# Patient Record
Sex: MALE | Race: WHITE | NOT HISPANIC OR LATINO | URBAN - METROPOLITAN AREA
[De-identification: names, ages, dates, MRNs, and addresses within clinical notes are randomized per-mention and may not be internally consistent; named-entity substitution may affect disease eponyms.]

---

## 2019-04-09 ENCOUNTER — EMERGENCY (EMERGENCY)
Facility: HOSPITAL | Age: 69
LOS: 0 days | Discharge: HOME | End: 2019-04-09
Attending: EMERGENCY MEDICINE | Admitting: EMERGENCY MEDICINE
Payer: MEDICARE

## 2019-04-09 VITALS
DIASTOLIC BLOOD PRESSURE: 87 MMHG | OXYGEN SATURATION: 99 % | TEMPERATURE: 97 F | SYSTOLIC BLOOD PRESSURE: 208 MMHG | RESPIRATION RATE: 20 BRPM | HEART RATE: 67 BPM

## 2019-04-09 VITALS — RESPIRATION RATE: 18 BRPM | DIASTOLIC BLOOD PRESSURE: 99 MMHG | HEART RATE: 70 BPM | SYSTOLIC BLOOD PRESSURE: 198 MMHG

## 2019-04-09 DIAGNOSIS — Z87.438 PERSONAL HISTORY OF OTHER DISEASES OF MALE GENITAL ORGANS: ICD-10-CM

## 2019-04-09 DIAGNOSIS — R10.9 UNSPECIFIED ABDOMINAL PAIN: ICD-10-CM

## 2019-04-09 DIAGNOSIS — R10.30 LOWER ABDOMINAL PAIN, UNSPECIFIED: ICD-10-CM

## 2019-04-09 DIAGNOSIS — N20.0 CALCULUS OF KIDNEY: ICD-10-CM

## 2019-04-09 LAB
ANION GAP SERPL CALC-SCNC: 13 MMOL/L — SIGNIFICANT CHANGE UP (ref 7–14)
APPEARANCE UR: CLEAR — SIGNIFICANT CHANGE UP
BASOPHILS # BLD AUTO: 0.03 K/UL — SIGNIFICANT CHANGE UP (ref 0–0.2)
BASOPHILS NFR BLD AUTO: 0.4 % — SIGNIFICANT CHANGE UP (ref 0–1)
BILIRUB UR-MCNC: NEGATIVE — SIGNIFICANT CHANGE UP
BUN SERPL-MCNC: 13 MG/DL — SIGNIFICANT CHANGE UP (ref 10–20)
CALCIUM SERPL-MCNC: 9.8 MG/DL — SIGNIFICANT CHANGE UP (ref 8.5–10.1)
CHLORIDE SERPL-SCNC: 100 MMOL/L — SIGNIFICANT CHANGE UP (ref 98–110)
CO2 SERPL-SCNC: 26 MMOL/L — SIGNIFICANT CHANGE UP (ref 17–32)
COLOR SPEC: YELLOW — SIGNIFICANT CHANGE UP
CREAT SERPL-MCNC: 1 MG/DL — SIGNIFICANT CHANGE UP (ref 0.7–1.5)
DIFF PNL FLD: NEGATIVE — SIGNIFICANT CHANGE UP
EOSINOPHIL # BLD AUTO: 0.31 K/UL — SIGNIFICANT CHANGE UP (ref 0–0.7)
EOSINOPHIL NFR BLD AUTO: 3.7 % — SIGNIFICANT CHANGE UP (ref 0–8)
GLUCOSE SERPL-MCNC: 84 MG/DL — SIGNIFICANT CHANGE UP (ref 70–99)
GLUCOSE UR QL: NEGATIVE MG/DL — SIGNIFICANT CHANGE UP
HCT VFR BLD CALC: 47 % — SIGNIFICANT CHANGE UP (ref 42–52)
HGB BLD-MCNC: 15.5 G/DL — SIGNIFICANT CHANGE UP (ref 14–18)
IMM GRANULOCYTES NFR BLD AUTO: 0.2 % — SIGNIFICANT CHANGE UP (ref 0.1–0.3)
KETONES UR-MCNC: NEGATIVE — SIGNIFICANT CHANGE UP
LEUKOCYTE ESTERASE UR-ACNC: NEGATIVE — SIGNIFICANT CHANGE UP
LYMPHOCYTES # BLD AUTO: 2.12 K/UL — SIGNIFICANT CHANGE UP (ref 1.2–3.4)
LYMPHOCYTES # BLD AUTO: 25.5 % — SIGNIFICANT CHANGE UP (ref 20.5–51.1)
MCHC RBC-ENTMCNC: 32.4 PG — HIGH (ref 27–31)
MCHC RBC-ENTMCNC: 33 G/DL — SIGNIFICANT CHANGE UP (ref 32–37)
MCV RBC AUTO: 98.3 FL — HIGH (ref 80–94)
MONOCYTES # BLD AUTO: 0.71 K/UL — HIGH (ref 0.1–0.6)
MONOCYTES NFR BLD AUTO: 8.6 % — SIGNIFICANT CHANGE UP (ref 1.7–9.3)
NEUTROPHILS # BLD AUTO: 5.11 K/UL — SIGNIFICANT CHANGE UP (ref 1.4–6.5)
NEUTROPHILS NFR BLD AUTO: 61.6 % — SIGNIFICANT CHANGE UP (ref 42.2–75.2)
NITRITE UR-MCNC: NEGATIVE — SIGNIFICANT CHANGE UP
NRBC # BLD: 0 /100 WBCS — SIGNIFICANT CHANGE UP (ref 0–0)
PH UR: 8 — SIGNIFICANT CHANGE UP (ref 5–8)
PLATELET # BLD AUTO: 200 K/UL — SIGNIFICANT CHANGE UP (ref 130–400)
POTASSIUM SERPL-MCNC: 4.2 MMOL/L — SIGNIFICANT CHANGE UP (ref 3.5–5)
POTASSIUM SERPL-SCNC: 4.2 MMOL/L — SIGNIFICANT CHANGE UP (ref 3.5–5)
PROT UR-MCNC: NEGATIVE MG/DL — SIGNIFICANT CHANGE UP
RBC # BLD: 4.78 M/UL — SIGNIFICANT CHANGE UP (ref 4.7–6.1)
RBC # FLD: 13.5 % — SIGNIFICANT CHANGE UP (ref 11.5–14.5)
SODIUM SERPL-SCNC: 139 MMOL/L — SIGNIFICANT CHANGE UP (ref 135–146)
SP GR SPEC: 1.01 — SIGNIFICANT CHANGE UP (ref 1.01–1.03)
UROBILINOGEN FLD QL: 0.2 MG/DL — SIGNIFICANT CHANGE UP (ref 0.2–0.2)
WBC # BLD: 8.3 K/UL — SIGNIFICANT CHANGE UP (ref 4.8–10.8)
WBC # FLD AUTO: 8.3 K/UL — SIGNIFICANT CHANGE UP (ref 4.8–10.8)

## 2019-04-09 PROCEDURE — 74177 CT ABD & PELVIS W/CONTRAST: CPT | Mod: 26

## 2019-04-09 PROCEDURE — 99284 EMERGENCY DEPT VISIT MOD MDM: CPT | Mod: GC

## 2019-04-09 RX ORDER — TAMSULOSIN HYDROCHLORIDE 0.4 MG/1
1 CAPSULE ORAL
Qty: 7 | Refills: 0
Start: 2019-04-09 | End: 2019-04-15

## 2019-04-09 NOTE — ED PROVIDER NOTE - CARE PROVIDERS DIRECT ADDRESSES
[email or web address]

## 2019-04-09 NOTE — ED PROVIDER NOTE - PHYSICAL EXAMINATION
CONSTITUTIONAL: Well-developed; well-nourished; in no acute distress. Well appearing   SKIN: warm, dry  HEAD: Normocephalic; atraumatic.  EYES: no conj injection  CARD: S1, S2 normal; no murmurs, gallops, or rubs. Regular rate and rhythm.   RESP: No wheezes, rales or rhonchi.  ABD: soft ntnd  : no CVA tenderness, no tenderness in suprapubic region   EXT: Normal ROM.  No clubbing, cyanosis or edema.   NEURO: Alert, oriented, grossly unremarkable  PSYCH: Cooperative, appropriate.

## 2019-04-09 NOTE — ED PROVIDER NOTE - NS ED ROS FT
Constitutional: See HPI.  Eyes: No visual changes, eye pain or discharge.  ENMT: No hearing changes, pain, discharge or infections. No neck pain or stiffness.  Cardiac: No chest pain, SOB or edema. No chest pain with exertion.  Respiratory: No cough or respiratory distress.   GI: +suprapubic abdominal pain; No nausea, vomiting, diarrhea   : No dysuria, frequency or burning.  MS: No myalgia, muscle weakness, joint pain or back pain.  Neuro: No headache or weakness. No LOC.  Skin: No skin rash.  Endo: No hx of DM, thyroid disease  Except as documented in HPI, all other review of systems is negative

## 2019-04-09 NOTE — ED PROVIDER NOTE - PROGRESS NOTE DETAILS
s/o mary f/u CT Patient aware of results - understands to follow up with PMD at scheduled appt tomorrow at 2 pm and to follow up with urologist as necessary. Patient aware of results - understands to follow up with PMD in [place] at scheduled appt tomorrow at 2 pm and to follow up with urologist as necessary. Patient found to be continuously hypertensive in ER likely due to pain - patient is aware and advised to follow up with PMD as scheduled for tomorrow at 2 pm.

## 2019-04-09 NOTE — ED PROVIDER NOTE - OBJECTIVE STATEMENT
70 y/o male with pmxh of BPH presents with abdominal pain. Patient states he woke up to urinate around 2-3AM earlier today, began to have suprapubic pain that radiates to the back b/l. Patient states the pain is constant, not alleviated by aleve, not positional. Patient denies fevers/chills, n/v/d, constipation, hematuria, decreased urine output.

## 2019-04-09 NOTE — ED ADULT NURSE REASSESSMENT NOTE - NS ED NURSE REASSESS COMMENT FT1
Pt vital signs assessed, MD Shah made aware, pt okay to be pt discharged to home and will f/u w/ PMD. Asymptomatic, no s/s of distress noted.

## 2019-04-09 NOTE — ED PROVIDER NOTE - ATTENDING CONTRIBUTION TO CARE
69 M to ED with abd pain suprapubic, bilat LQ x 1 day.  No fevers, no sick contacts, no injury or fall.   pain persistent but improving in ED.   No h/o abd sx or  disease.    AVSS, exam as noted, CTAB, RRR, abdomen suprapubic tenderness, (+) bowel sounds, neuro nonfocal

## 2019-04-09 NOTE — ED ADULT NURSE NOTE - CHPI ED NUR SYMPTOMS NEG
no chills/no diarrhea/no nausea/no fever/no hematuria/no abdominal distension/no blood in stool/no vomiting

## 2019-04-10 LAB
CULTURE RESULTS: SIGNIFICANT CHANGE UP
SPECIMEN SOURCE: SIGNIFICANT CHANGE UP

## 2019-10-17 PROBLEM — Z00.00 ENCOUNTER FOR PREVENTIVE HEALTH EXAMINATION: Status: ACTIVE | Noted: 2019-10-17

## 2019-11-01 ENCOUNTER — EMERGENCY (EMERGENCY)
Facility: HOSPITAL | Age: 69
LOS: 0 days | Discharge: HOME | End: 2019-11-01
Attending: STUDENT IN AN ORGANIZED HEALTH CARE EDUCATION/TRAINING PROGRAM | Admitting: EMERGENCY MEDICINE
Payer: MEDICARE

## 2019-11-01 VITALS
DIASTOLIC BLOOD PRESSURE: 86 MMHG | SYSTOLIC BLOOD PRESSURE: 192 MMHG | HEART RATE: 51 BPM | TEMPERATURE: 98 F | RESPIRATION RATE: 16 BRPM | OXYGEN SATURATION: 100 %

## 2019-11-01 VITALS
HEART RATE: 67 BPM | WEIGHT: 164.91 LBS | SYSTOLIC BLOOD PRESSURE: 199 MMHG | RESPIRATION RATE: 16 BRPM | TEMPERATURE: 96 F | OXYGEN SATURATION: 100 % | DIASTOLIC BLOOD PRESSURE: 93 MMHG

## 2019-11-01 DIAGNOSIS — K40.90 UNILATERAL INGUINAL HERNIA, WITHOUT OBSTRUCTION OR GANGRENE, NOT SPECIFIED AS RECURRENT: ICD-10-CM

## 2019-11-01 DIAGNOSIS — R10.30 LOWER ABDOMINAL PAIN, UNSPECIFIED: ICD-10-CM

## 2019-11-01 LAB
ALBUMIN SERPL ELPH-MCNC: 4.5 G/DL — SIGNIFICANT CHANGE UP (ref 3.5–5.2)
ALP SERPL-CCNC: 99 U/L — SIGNIFICANT CHANGE UP (ref 30–115)
ALT FLD-CCNC: 14 U/L — SIGNIFICANT CHANGE UP (ref 0–41)
ANION GAP SERPL CALC-SCNC: 17 MMOL/L — HIGH (ref 7–14)
APPEARANCE UR: CLEAR — SIGNIFICANT CHANGE UP
AST SERPL-CCNC: 21 U/L — SIGNIFICANT CHANGE UP (ref 0–41)
BASOPHILS # BLD AUTO: 0.04 K/UL — SIGNIFICANT CHANGE UP (ref 0–0.2)
BASOPHILS NFR BLD AUTO: 0.4 % — SIGNIFICANT CHANGE UP (ref 0–1)
BILIRUB SERPL-MCNC: 0.6 MG/DL — SIGNIFICANT CHANGE UP (ref 0.2–1.2)
BILIRUB UR-MCNC: NEGATIVE — SIGNIFICANT CHANGE UP
BUN SERPL-MCNC: 14 MG/DL — SIGNIFICANT CHANGE UP (ref 10–20)
CALCIUM SERPL-MCNC: 9.7 MG/DL — SIGNIFICANT CHANGE UP (ref 8.5–10.1)
CHLORIDE SERPL-SCNC: 100 MMOL/L — SIGNIFICANT CHANGE UP (ref 98–110)
CO2 SERPL-SCNC: 23 MMOL/L — SIGNIFICANT CHANGE UP (ref 17–32)
COLOR SPEC: YELLOW — SIGNIFICANT CHANGE UP
CREAT SERPL-MCNC: 1.1 MG/DL — SIGNIFICANT CHANGE UP (ref 0.7–1.5)
DIFF PNL FLD: NEGATIVE — SIGNIFICANT CHANGE UP
EOSINOPHIL # BLD AUTO: 0.22 K/UL — SIGNIFICANT CHANGE UP (ref 0–0.7)
EOSINOPHIL NFR BLD AUTO: 2.2 % — SIGNIFICANT CHANGE UP (ref 0–8)
GLUCOSE SERPL-MCNC: 99 MG/DL — SIGNIFICANT CHANGE UP (ref 70–99)
GLUCOSE UR QL: NEGATIVE — SIGNIFICANT CHANGE UP
HCT VFR BLD CALC: 48.2 % — SIGNIFICANT CHANGE UP (ref 42–52)
HGB BLD-MCNC: 16.2 G/DL — SIGNIFICANT CHANGE UP (ref 14–18)
IMM GRANULOCYTES NFR BLD AUTO: 0.4 % — HIGH (ref 0.1–0.3)
KETONES UR-MCNC: NEGATIVE — SIGNIFICANT CHANGE UP
LACTATE SERPL-SCNC: 1.7 MMOL/L — SIGNIFICANT CHANGE UP (ref 0.5–2.2)
LEUKOCYTE ESTERASE UR-ACNC: NEGATIVE — SIGNIFICANT CHANGE UP
LIDOCAIN IGE QN: 27 U/L — SIGNIFICANT CHANGE UP (ref 7–60)
LYMPHOCYTES # BLD AUTO: 1.98 K/UL — SIGNIFICANT CHANGE UP (ref 1.2–3.4)
LYMPHOCYTES # BLD AUTO: 19.9 % — LOW (ref 20.5–51.1)
MCHC RBC-ENTMCNC: 32.8 PG — HIGH (ref 27–31)
MCHC RBC-ENTMCNC: 33.6 G/DL — SIGNIFICANT CHANGE UP (ref 32–37)
MCV RBC AUTO: 97.6 FL — HIGH (ref 80–94)
MONOCYTES # BLD AUTO: 0.79 K/UL — HIGH (ref 0.1–0.6)
MONOCYTES NFR BLD AUTO: 7.9 % — SIGNIFICANT CHANGE UP (ref 1.7–9.3)
NEUTROPHILS # BLD AUTO: 6.88 K/UL — HIGH (ref 1.4–6.5)
NEUTROPHILS NFR BLD AUTO: 69.2 % — SIGNIFICANT CHANGE UP (ref 42.2–75.2)
NITRITE UR-MCNC: NEGATIVE — SIGNIFICANT CHANGE UP
NRBC # BLD: 0 /100 WBCS — SIGNIFICANT CHANGE UP (ref 0–0)
PH UR: 6 — SIGNIFICANT CHANGE UP (ref 5–8)
PLATELET # BLD AUTO: 189 K/UL — SIGNIFICANT CHANGE UP (ref 130–400)
POTASSIUM SERPL-MCNC: 4.4 MMOL/L — SIGNIFICANT CHANGE UP (ref 3.5–5)
POTASSIUM SERPL-SCNC: 4.4 MMOL/L — SIGNIFICANT CHANGE UP (ref 3.5–5)
PROT SERPL-MCNC: 7.1 G/DL — SIGNIFICANT CHANGE UP (ref 6–8)
PROT UR-MCNC: SIGNIFICANT CHANGE UP
RBC # BLD: 4.94 M/UL — SIGNIFICANT CHANGE UP (ref 4.7–6.1)
RBC # FLD: 13.3 % — SIGNIFICANT CHANGE UP (ref 11.5–14.5)
SODIUM SERPL-SCNC: 140 MMOL/L — SIGNIFICANT CHANGE UP (ref 135–146)
SP GR SPEC: 1.02 — SIGNIFICANT CHANGE UP (ref 1.01–1.02)
UROBILINOGEN FLD QL: ABNORMAL
WBC # BLD: 9.95 K/UL — SIGNIFICANT CHANGE UP (ref 4.8–10.8)
WBC # FLD AUTO: 9.95 K/UL — SIGNIFICANT CHANGE UP (ref 4.8–10.8)

## 2019-11-01 PROCEDURE — 74177 CT ABD & PELVIS W/CONTRAST: CPT | Mod: 26

## 2019-11-01 PROCEDURE — 99284 EMERGENCY DEPT VISIT MOD MDM: CPT | Mod: GC

## 2019-11-01 NOTE — ED PROVIDER NOTE - NSFOLLOWUPINSTRUCTIONS_ED_ALL_ED_FT
Inguinal Hernia    WHAT YOU NEED TO KNOW:    An inguinal hernia happens when organs or abdominal tissue push through a weak spot in the abdominal wall. The abdominal wall is made of fat and muscle. It holds the intestines in place. The hernia may contain fluid, tissue from the abdomen, or part of an organ (such as an intestine).Inguinal Hernia         DISCHARGE INSTRUCTIONS:    Return to the emergency department if:     You have severe abdominal pain with nausea and vomiting.       Your abdomen is larger than usual.       Your hernia gets bigger or is purple or blue.       You see blood in your bowel movements.      You feel weak, dizzy, or faint.     Contact your healthcare provider if:     You have a fever.      You have questions or concerns about your condition or care.    Medicine: You may need the following:     NSAIDs, such as ibuprofen, help decrease swelling, pain, and fever. NSAIDs can cause stomach bleeding or kidney problems in certain people. If you take blood thinner medicine, always ask your healthcare provider if NSAIDs are safe for you. Always read the medicine label and follow directions.      Take your medicine as directed. Contact your healthcare provider if you think your medicine is not helping or if you have side effects. Tell him or her if you are allergic to any medicine. Keep a list of the medicines, vitamins, and herbs you take. Include the amounts, and when and why you take them. Bring the list or the pill bottles to follow-up visits. Carry your medicine list with you in case of an emergency.    Follow up with your healthcare provider as directed: Write down your questions so you remember to ask them during your visits.    Manage your symptoms and prevent another hernia:     Do not lift anything heavy. Heavy lifting can make your hernia worse or cause another hernia. Ask your healthcare provider how much is safe for you to lift.       Drink liquids as directed. Liquids may prevent constipation and straining during a bowel movement. Ask how much liquid to drink each day and which liquids are best for you.       Eat foods high in fiber. Fiber may prevent constipation and straining during a bowel movement. Foods that contain fiber include fruits, vegetables, beans, lentils, and whole grains.       Maintain a healthy weight. If you are overweight, weight loss may prevent your hernia from getting worse. It may also prevent another hernia. Talk to your healthcare provider about exercise and how to lose weight safely if you are overweight.       Do not smoke. Nicotine and other chemicals in cigarettes and cigars can weaken the abdominal wall. This may increase your risk for another hernia. Ask your healthcare provider for information if you currently smoke and need help to quit. E-cigarettes or smokeless tobacco still contain nicotine. Talk to your healthcare provider before you use these products.

## 2019-11-01 NOTE — ED PROVIDER NOTE - CLINICAL SUMMARY MEDICAL DECISION MAKING FREE TEXT BOX
pt presents w/ inguinal hernia pain. in ed, pain significantly improved after reduciton. ct performed to assess for strangulation given duration. no strangulation. pt w/ normal appetite and BMs. discussed home management of hernia, return precautions and need for surgical f/u for definitive care

## 2019-11-01 NOTE — ED PROVIDER NOTE - NS ED ROS FT
Constitutional: No fevers.   Eyes:  No visual changes, eye pain or discharge.  ENMT:  No hearing changes, pain, no sore throat or runny nose, no difficulty swallowing.   Cardiac:  No chest pain, SOB or edema. No chest pain with exertion.  Respiratory:  No cough or respiratory distress. No hemoptysis.   GI:  +inguinal pain.   :  No dysuria, frequency or burning.  MS:  No myalgia, muscle weakness, joint pain or back pain.  Neuro:  No headache or weakness.  No LOC.  Skin:  No skin rash.   Endocrine: No history of thyroid disease or diabetes.

## 2019-11-01 NOTE — ED PROVIDER NOTE - PRIOR HOSPITAL/ED VISITS SUMMARY FREE TEXT FOR MDM OBTAINED AND REVIEWED OLD RECORDS QUESTION
april 2019 ct scan:  Obstructing 4 mm left UVJ calculus with mild hydroureteronephrosis.   Otherwise unremarkable examination.

## 2019-11-01 NOTE — ED PROVIDER NOTE - PHYSICAL EXAMINATION
CONSTITUTIONAL: Well-developed; well-nourished; appeared uncomfortable initially.   SKIN: warm, dry.  HEAD: Normocephalic; atraumatic.  EYES: PERRL, EOMI, no conjunctival erythema.  ENT: No nasal discharge; airway clear.  NECK: Supple; non tender.  CARD: S1, S2 normal; no murmurs, gallops, or rubs. Regular rate and rhythm.   RESP: No wheezes, rales or rhonchi.  ABD: soft ntnd.  : Testicles symmetric, nontender, cremasteric reflex intact. Minimal edema to the right groin; reducible right inguinal hernia with immediate resolution of pain.   EXT: Normal ROM.  No clubbing, cyanosis or edema.   NEURO: Alert, oriented, grossly unremarkable.  PSYCH: Cooperative, appropriate.

## 2019-11-01 NOTE — ED ADULT NURSE NOTE - OBJECTIVE STATEMENT
Pt presents to the ED with c/o right groin pain x 2 weeks after he sneezed. Pt states pain is only getting worse. Pt has hx of hernia on the left groin.

## 2019-11-01 NOTE — ED PROVIDER NOTE - ATTENDING CONTRIBUTION TO CARE
right groin pain for 2 weeks that worsened after coughing, no vomiting, nml bm. on exam no hernia palpated abd soft. lungs cta, heart nml. after evaluation by resident patient felt immediate relief on hernia palpation. plan is to get ct scan, labs.

## 2019-11-01 NOTE — ED PROVIDER NOTE - PATIENT PORTAL LINK FT
You can access the FollowMyHealth Patient Portal offered by [facility] by registering at the following website: [email or web address]. By joining [employer]’s FollowMyHealth portal, you will also be able to view your health information using other applications (apps) compatible with our system.

## 2019-11-01 NOTE — ED PROVIDER NOTE - CARE PROVIDER_API CALL
Benjamín Hopper)  Surgery  [address] [facility] [facility] [address]  [place] [address]  Phone: [phone]  Fax: [phone]  Follow Up Time:

## 2019-11-11 ENCOUNTER — INBOUND DOCUMENT (OUTPATIENT)
Age: 69
End: 2019-11-11

## 2019-11-15 ENCOUNTER — OUTPATIENT (OUTPATIENT)
Dept: OUTPATIENT SERVICES | Facility: HOSPITAL | Age: 69
LOS: 1 days | Discharge: HOME | End: 2019-11-15
Payer: MEDICARE

## 2019-11-15 VITALS
DIASTOLIC BLOOD PRESSURE: 102 MMHG | WEIGHT: 167.11 LBS | HEART RATE: 63 BPM | RESPIRATION RATE: 15 BRPM | OXYGEN SATURATION: 99 % | SYSTOLIC BLOOD PRESSURE: 196 MMHG | TEMPERATURE: 98 F | HEIGHT: 68 IN

## 2019-11-15 DIAGNOSIS — Z01.818 ENCOUNTER FOR OTHER PREPROCEDURAL EXAMINATION: ICD-10-CM

## 2019-11-15 DIAGNOSIS — K40.90 UNILATERAL INGUINAL HERNIA, WITHOUT OBSTRUCTION OR GANGRENE, NOT SPECIFIED AS RECURRENT: ICD-10-CM

## 2019-11-15 DIAGNOSIS — Z90.89 ACQUIRED ABSENCE OF OTHER ORGANS: Chronic | ICD-10-CM

## 2019-11-15 LAB
ALBUMIN SERPL ELPH-MCNC: 4.7 G/DL — SIGNIFICANT CHANGE UP (ref 3.5–5.2)
ALP SERPL-CCNC: 113 U/L — SIGNIFICANT CHANGE UP (ref 30–115)
ALT FLD-CCNC: 16 U/L — SIGNIFICANT CHANGE UP (ref 0–41)
ANION GAP SERPL CALC-SCNC: 16 MMOL/L — HIGH (ref 7–14)
APPEARANCE UR: CLEAR — SIGNIFICANT CHANGE UP
APTT BLD: 28.1 SEC — SIGNIFICANT CHANGE UP (ref 27–39.2)
AST SERPL-CCNC: 22 U/L — SIGNIFICANT CHANGE UP (ref 0–41)
BILIRUB SERPL-MCNC: 0.6 MG/DL — SIGNIFICANT CHANGE UP (ref 0.2–1.2)
BILIRUB UR-MCNC: NEGATIVE — SIGNIFICANT CHANGE UP
BUN SERPL-MCNC: 14 MG/DL — SIGNIFICANT CHANGE UP (ref 10–20)
CALCIUM SERPL-MCNC: 10.2 MG/DL — HIGH (ref 8.5–10.1)
CHLORIDE SERPL-SCNC: 98 MMOL/L — SIGNIFICANT CHANGE UP (ref 98–110)
CO2 SERPL-SCNC: 27 MMOL/L — SIGNIFICANT CHANGE UP (ref 17–32)
COLOR SPEC: YELLOW — SIGNIFICANT CHANGE UP
CREAT SERPL-MCNC: 1 MG/DL — SIGNIFICANT CHANGE UP (ref 0.7–1.5)
DIFF PNL FLD: NEGATIVE — SIGNIFICANT CHANGE UP
GLUCOSE SERPL-MCNC: 87 MG/DL — SIGNIFICANT CHANGE UP (ref 70–99)
GLUCOSE UR QL: NEGATIVE — SIGNIFICANT CHANGE UP
INR BLD: 0.95 RATIO — SIGNIFICANT CHANGE UP (ref 0.65–1.3)
KETONES UR-MCNC: NEGATIVE — SIGNIFICANT CHANGE UP
LEUKOCYTE ESTERASE UR-ACNC: NEGATIVE — SIGNIFICANT CHANGE UP
NITRITE UR-MCNC: NEGATIVE — SIGNIFICANT CHANGE UP
PH UR: 6.5 — SIGNIFICANT CHANGE UP (ref 5–8)
POTASSIUM SERPL-MCNC: 4.4 MMOL/L — SIGNIFICANT CHANGE UP (ref 3.5–5)
POTASSIUM SERPL-SCNC: 4.4 MMOL/L — SIGNIFICANT CHANGE UP (ref 3.5–5)
PROT SERPL-MCNC: 7.4 G/DL — SIGNIFICANT CHANGE UP (ref 6–8)
PROT UR-MCNC: NEGATIVE — SIGNIFICANT CHANGE UP
PROTHROM AB SERPL-ACNC: 10.9 SEC — SIGNIFICANT CHANGE UP (ref 9.95–12.87)
SODIUM SERPL-SCNC: 141 MMOL/L — SIGNIFICANT CHANGE UP (ref 135–146)
SP GR SPEC: 1.02 — SIGNIFICANT CHANGE UP (ref 1.01–1.02)
UROBILINOGEN FLD QL: SIGNIFICANT CHANGE UP

## 2019-11-15 PROCEDURE — 93010 ELECTROCARDIOGRAM REPORT: CPT

## 2019-11-15 PROCEDURE — 71046 X-RAY EXAM CHEST 2 VIEWS: CPT | Mod: 26

## 2019-11-15 NOTE — H&P PST ADULT - NSANTHOSAYNRD_GEN_A_CORE
No. LALA screening performed.  STOP BANG Legend: 0-2 = LOW Risk; 3-4 = INTERMEDIATE Risk; 5-8 = HIGH Risk/denies see lala tool

## 2019-11-15 NOTE — H&P PST ADULT - HISTORY OF PRESENT ILLNESS
69 year old male here for bilateral hernia repair,states had hernia 3-4 months and experiencing pain.  fos=4 limited by hernia pain  denies chest pain sob palp  denies recent uri or uti

## 2019-11-21 PROBLEM — K40.90 UNILATERAL INGUINAL HERNIA, WITHOUT OBSTRUCTION OR GANGRENE, NOT SPECIFIED AS RECURRENT: Chronic | Status: ACTIVE | Noted: 2019-11-15

## 2019-11-21 PROBLEM — K21.9 GASTRO-ESOPHAGEAL REFLUX DISEASE WITHOUT ESOPHAGITIS: Chronic | Status: ACTIVE | Noted: 2019-11-15

## 2019-11-26 ENCOUNTER — APPOINTMENT (OUTPATIENT)
Dept: SURGERY | Facility: CLINIC | Age: 69
End: 2019-11-26

## 2019-11-29 ENCOUNTER — OUTPATIENT (OUTPATIENT)
Dept: OUTPATIENT SERVICES | Facility: HOSPITAL | Age: 69
LOS: 1 days | Discharge: HOME | End: 2019-11-29

## 2019-11-29 VITALS
WEIGHT: 162.04 LBS | DIASTOLIC BLOOD PRESSURE: 95 MMHG | HEART RATE: 57 BPM | TEMPERATURE: 98 F | SYSTOLIC BLOOD PRESSURE: 196 MMHG | HEIGHT: 68 IN | RESPIRATION RATE: 16 BRPM

## 2019-11-29 VITALS
RESPIRATION RATE: 16 BRPM | SYSTOLIC BLOOD PRESSURE: 170 MMHG | HEART RATE: 50 BPM | OXYGEN SATURATION: 96 % | DIASTOLIC BLOOD PRESSURE: 76 MMHG

## 2019-11-29 DIAGNOSIS — Z90.89 ACQUIRED ABSENCE OF OTHER ORGANS: Chronic | ICD-10-CM

## 2019-11-29 RX ORDER — SUCRALFATE 1 G
1 TABLET ORAL
Qty: 0 | Refills: 0 | DISCHARGE

## 2019-11-29 RX ORDER — ONDANSETRON 8 MG/1
4 TABLET, FILM COATED ORAL ONCE
Refills: 0 | Status: DISCONTINUED | OUTPATIENT
Start: 2019-11-29 | End: 2020-01-09

## 2019-11-29 RX ORDER — AMLODIPINE BESYLATE 2.5 MG/1
1 TABLET ORAL
Qty: 0 | Refills: 0 | DISCHARGE

## 2019-11-29 RX ORDER — MORPHINE SULFATE 50 MG/1
4 CAPSULE, EXTENDED RELEASE ORAL
Refills: 0 | Status: DISCONTINUED | OUTPATIENT
Start: 2019-11-29 | End: 2019-11-29

## 2019-11-29 RX ORDER — SUCRALFATE 1 G
0 TABLET ORAL
Qty: 0 | Refills: 0 | DISCHARGE

## 2019-11-29 RX ORDER — OMEPRAZOLE 10 MG/1
1 CAPSULE, DELAYED RELEASE ORAL
Qty: 0 | Refills: 0 | DISCHARGE

## 2019-11-29 RX ORDER — SODIUM CHLORIDE 9 MG/ML
1000 INJECTION, SOLUTION INTRAVENOUS
Refills: 0 | Status: DISCONTINUED | OUTPATIENT
Start: 2019-11-29 | End: 2020-01-09

## 2019-11-29 RX ORDER — OXYCODONE AND ACETAMINOPHEN 5; 325 MG/1; MG/1
1 TABLET ORAL EVERY 4 HOURS
Refills: 0 | Status: DISCONTINUED | OUTPATIENT
Start: 2019-11-29 | End: 2019-11-29

## 2019-11-29 RX ADMIN — SODIUM CHLORIDE 100 MILLILITER(S): 9 INJECTION, SOLUTION INTRAVENOUS at 10:49

## 2019-11-29 RX ADMIN — OXYCODONE AND ACETAMINOPHEN 1 TABLET(S): 5; 325 TABLET ORAL at 11:04

## 2019-11-29 NOTE — ASU DISCHARGE PLAN (ADULT/PEDIATRIC) - CARE PROVIDER_API CALL
Yomi Sparks)  Surgery; Surgical Critical Care  [address] [place] [address]  Phone: [phone]  Fax: [phone]  Established Patient  Follow Up Time: 1 week

## 2019-11-29 NOTE — PRE-ANESTHESIA EVALUATION ADULT - NSANTHOSAYNRD_GEN_A_CORE
denies see lynne tool/No. LYNNE screening performed.  STOP BANG Legend: 0-2 = LOW Risk; 3-4 = INTERMEDIATE Risk; 5-8 = HIGH Risk

## 2019-11-29 NOTE — ASU DISCHARGE PLAN (ADULT/PEDIATRIC) - CALL YOUR DOCTOR IF YOU HAVE ANY OF THE FOLLOWING:
Wound/Surgical Site with redness, or foul smelling discharge or pus/Bleeding that does not stop/Swelling that gets worse/Pain not relieved by Medications/Fever greater than (need to indicate Fahrenheit or Celsius)

## 2019-12-04 DIAGNOSIS — F17.210 NICOTINE DEPENDENCE, CIGARETTES, UNCOMPLICATED: ICD-10-CM

## 2019-12-04 DIAGNOSIS — I10 ESSENTIAL (PRIMARY) HYPERTENSION: ICD-10-CM

## 2019-12-04 DIAGNOSIS — K40.20 BILATERAL INGUINAL HERNIA, WITHOUT OBSTRUCTION OR GANGRENE, NOT SPECIFIED AS RECURRENT: ICD-10-CM

## 2019-12-04 DIAGNOSIS — K21.9 GASTRO-ESOPHAGEAL REFLUX DISEASE WITHOUT ESOPHAGITIS: ICD-10-CM

## 2019-12-04 DIAGNOSIS — Z90.89 ACQUIRED ABSENCE OF OTHER ORGANS: ICD-10-CM

## 2020-08-04 ENCOUNTER — APPOINTMENT (OUTPATIENT)
Dept: SURGERY | Facility: CLINIC | Age: 70
End: 2020-08-04
Payer: MEDICARE

## 2020-08-04 VITALS
DIASTOLIC BLOOD PRESSURE: 93 MMHG | WEIGHT: 162 LBS | BODY MASS INDEX: 25.43 KG/M2 | HEIGHT: 67 IN | HEART RATE: 66 BPM | TEMPERATURE: 97.2 F | SYSTOLIC BLOOD PRESSURE: 145 MMHG

## 2020-08-04 DIAGNOSIS — Z87.19 PERSONAL HISTORY OF OTHER DISEASES OF THE DIGESTIVE SYSTEM: ICD-10-CM

## 2020-08-04 DIAGNOSIS — Z80.3 FAMILY HISTORY OF MALIGNANT NEOPLASM OF BREAST: ICD-10-CM

## 2020-08-04 DIAGNOSIS — I10 ESSENTIAL (PRIMARY) HYPERTENSION: ICD-10-CM

## 2020-08-04 PROCEDURE — 46600 DIAGNOSTIC ANOSCOPY SPX: CPT

## 2020-08-04 PROCEDURE — 99203 OFFICE O/P NEW LOW 30 MIN: CPT | Mod: 25

## 2020-08-10 PROBLEM — I10 HIGH BLOOD PRESSURE: Status: ACTIVE | Noted: 2020-08-04

## 2020-08-10 PROBLEM — Z87.19 HISTORY OF RECTAL POLYPS: Status: RESOLVED | Noted: 2020-08-04 | Resolved: 2020-08-10

## 2020-08-10 PROBLEM — Z80.3 FAMILY HISTORY OF MALIGNANT NEOPLASM OF BREAST: Status: ACTIVE | Noted: 2020-08-04

## 2020-08-10 RX ORDER — HYDROCORTISONE 2.5% 25 MG/G
CREAM TOPICAL
Refills: 0 | Status: ACTIVE | COMMUNITY

## 2020-08-10 RX ORDER — OMEPRAZOLE 100 %
POWDER (GRAM) MISCELLANEOUS
Refills: 0 | Status: ACTIVE | COMMUNITY

## 2020-08-10 RX ORDER — AMLODIPINE BESYLATE 5 MG/1
TABLET ORAL
Refills: 0 | Status: ACTIVE | COMMUNITY

## 2020-08-25 ENCOUNTER — APPOINTMENT (OUTPATIENT)
Dept: SURGERY | Facility: CLINIC | Age: 70
End: 2020-08-25
Payer: MEDICARE

## 2020-08-25 VITALS
HEART RATE: 72 BPM | WEIGHT: 163 LBS | SYSTOLIC BLOOD PRESSURE: 150 MMHG | TEMPERATURE: 97.2 F | DIASTOLIC BLOOD PRESSURE: 78 MMHG | BODY MASS INDEX: 25.58 KG/M2 | HEIGHT: 67 IN

## 2020-08-25 PROCEDURE — 99214 OFFICE O/P EST MOD 30 MIN: CPT

## 2020-08-25 NOTE — PHYSICAL EXAM
[FreeTextEntry1] : Looks and feels well.\par \par Vitals as noted above.\par \par Examination of the perineum reveals a nearly resolved thrombosed external hemorrhoid.

## 2020-08-25 NOTE — ASSESSMENT
[FreeTextEntry1] : Mr. Cottrell feels well. He has no further complaints. He feels 95% improved. He needs no further therapy for his thrombocytopenia. He does need a repeat colonoscopy. His last colonoscopy was 3-4 years ago and at that time he had polyps. He states he's had numerous polyps over the years. I will get the report of his previous colonoscopies which were done elsewhere and he will return to see me in one month at which time we'll discuss colonoscopy and we will get it scheduled at his convenience.

## 2020-08-25 NOTE — HISTORY OF PRESENT ILLNESS
[FreeTextEntry1] : Is a followup visit for Mr. nemesio Lazcano who is being treated for the thrombosed external hemorrhoid. Mr. Lazcano reports that he is feeling well. He feels 95% improved. He states that he barely used to cream as he felt well. He denies any bleeding or drainage.

## 2020-09-23 ENCOUNTER — INPATIENT (INPATIENT)
Facility: HOSPITAL | Age: 70
LOS: 3 days | Discharge: HOME | End: 2020-09-27
Attending: SURGERY | Admitting: SURGERY
Payer: MEDICARE

## 2020-09-23 VITALS
SYSTOLIC BLOOD PRESSURE: 146 MMHG | RESPIRATION RATE: 20 BRPM | TEMPERATURE: 98 F | OXYGEN SATURATION: 98 % | HEIGHT: 68 IN | DIASTOLIC BLOOD PRESSURE: 110 MMHG | WEIGHT: 166.01 LBS | HEART RATE: 92 BPM

## 2020-09-23 DIAGNOSIS — I80.00 PHLEBITIS AND THROMBOPHLEBITIS OF SUPERFICIAL VESSELS OF UNSPECIFIED LOWER EXTREMITY: ICD-10-CM

## 2020-09-23 DIAGNOSIS — Z90.89 ACQUIRED ABSENCE OF OTHER ORGANS: Chronic | ICD-10-CM

## 2020-09-23 DIAGNOSIS — Z00.6 ENCOUNTER FOR EXAMINATION FOR NORMAL COMPARISON AND CONTROL IN CLINICAL RESEARCH PROGRAM: ICD-10-CM

## 2020-09-23 LAB
ALBUMIN SERPL ELPH-MCNC: 4 G/DL — SIGNIFICANT CHANGE UP (ref 3.5–5.2)
ALP SERPL-CCNC: 147 U/L — HIGH (ref 30–115)
ALT FLD-CCNC: 6 U/L — SIGNIFICANT CHANGE UP (ref 0–41)
ANION GAP SERPL CALC-SCNC: 12 MMOL/L — SIGNIFICANT CHANGE UP (ref 7–14)
APTT BLD: 25.1 SEC — LOW (ref 27–39.2)
APTT BLD: 25.8 SEC — LOW (ref 27–39.2)
AST SERPL-CCNC: 14 U/L — SIGNIFICANT CHANGE UP (ref 0–41)
BASOPHILS # BLD AUTO: 0.04 K/UL — SIGNIFICANT CHANGE UP (ref 0–0.2)
BASOPHILS NFR BLD AUTO: 0.4 % — SIGNIFICANT CHANGE UP (ref 0–1)
BILIRUB SERPL-MCNC: 0.5 MG/DL — SIGNIFICANT CHANGE UP (ref 0.2–1.2)
BLD GP AB SCN SERPL QL: SIGNIFICANT CHANGE UP
BUN SERPL-MCNC: 15 MG/DL — SIGNIFICANT CHANGE UP (ref 10–20)
CALCIUM SERPL-MCNC: 9.5 MG/DL — SIGNIFICANT CHANGE UP (ref 8.5–10.1)
CHLORIDE SERPL-SCNC: 101 MMOL/L — SIGNIFICANT CHANGE UP (ref 98–110)
CO2 SERPL-SCNC: 28 MMOL/L — SIGNIFICANT CHANGE UP (ref 17–32)
CREAT SERPL-MCNC: 1.1 MG/DL — SIGNIFICANT CHANGE UP (ref 0.7–1.5)
EOSINOPHIL # BLD AUTO: 0.23 K/UL — SIGNIFICANT CHANGE UP (ref 0–0.7)
EOSINOPHIL NFR BLD AUTO: 2.3 % — SIGNIFICANT CHANGE UP (ref 0–8)
GLUCOSE SERPL-MCNC: 88 MG/DL — SIGNIFICANT CHANGE UP (ref 70–99)
HCT VFR BLD CALC: 50.1 % — SIGNIFICANT CHANGE UP (ref 42–52)
HGB BLD-MCNC: 16.7 G/DL — SIGNIFICANT CHANGE UP (ref 14–18)
IMM GRANULOCYTES NFR BLD AUTO: 0.5 % — HIGH (ref 0.1–0.3)
INR BLD: 0.97 RATIO — SIGNIFICANT CHANGE UP (ref 0.65–1.3)
LYMPHOCYTES # BLD AUTO: 2.22 K/UL — SIGNIFICANT CHANGE UP (ref 1.2–3.4)
LYMPHOCYTES # BLD AUTO: 22.4 % — SIGNIFICANT CHANGE UP (ref 20.5–51.1)
MCHC RBC-ENTMCNC: 33.3 G/DL — SIGNIFICANT CHANGE UP (ref 32–37)
MCHC RBC-ENTMCNC: 33.3 PG — HIGH (ref 27–31)
MCV RBC AUTO: 100 FL — HIGH (ref 80–94)
MONOCYTES # BLD AUTO: 0.7 K/UL — HIGH (ref 0.1–0.6)
MONOCYTES NFR BLD AUTO: 7.1 % — SIGNIFICANT CHANGE UP (ref 1.7–9.3)
NEUTROPHILS # BLD AUTO: 6.65 K/UL — HIGH (ref 1.4–6.5)
NEUTROPHILS NFR BLD AUTO: 67.3 % — SIGNIFICANT CHANGE UP (ref 42.2–75.2)
NRBC # BLD: 0 /100 WBCS — SIGNIFICANT CHANGE UP (ref 0–0)
PLATELET # BLD AUTO: 193 K/UL — SIGNIFICANT CHANGE UP (ref 130–400)
POTASSIUM SERPL-MCNC: 4.2 MMOL/L — SIGNIFICANT CHANGE UP (ref 3.5–5)
POTASSIUM SERPL-SCNC: 4.2 MMOL/L — SIGNIFICANT CHANGE UP (ref 3.5–5)
PROT SERPL-MCNC: 6.7 G/DL — SIGNIFICANT CHANGE UP (ref 6–8)
PROTHROM AB SERPL-ACNC: 11.1 SEC — SIGNIFICANT CHANGE UP (ref 9.95–12.87)
RBC # BLD: 5.01 M/UL — SIGNIFICANT CHANGE UP (ref 4.7–6.1)
RBC # FLD: 13.9 % — SIGNIFICANT CHANGE UP (ref 11.5–14.5)
SODIUM SERPL-SCNC: 141 MMOL/L — SIGNIFICANT CHANGE UP (ref 135–146)
TROPONIN T SERPL-MCNC: <0.01 NG/ML — SIGNIFICANT CHANGE UP
WBC # BLD: 9.89 K/UL — SIGNIFICANT CHANGE UP (ref 4.8–10.8)
WBC # FLD AUTO: 9.89 K/UL — SIGNIFICANT CHANGE UP (ref 4.8–10.8)

## 2020-09-23 PROCEDURE — 99222 1ST HOSP IP/OBS MODERATE 55: CPT

## 2020-09-23 PROCEDURE — 71046 X-RAY EXAM CHEST 2 VIEWS: CPT | Mod: 26

## 2020-09-23 PROCEDURE — 93970 EXTREMITY STUDY: CPT | Mod: 26

## 2020-09-23 PROCEDURE — 99285 EMERGENCY DEPT VISIT HI MDM: CPT | Mod: CS

## 2020-09-23 PROCEDURE — 93010 ELECTROCARDIOGRAM REPORT: CPT

## 2020-09-23 RX ORDER — PANTOPRAZOLE SODIUM 20 MG/1
40 TABLET, DELAYED RELEASE ORAL
Refills: 0 | Status: DISCONTINUED | OUTPATIENT
Start: 2020-09-23 | End: 2020-09-25

## 2020-09-23 RX ORDER — MORPHINE SULFATE 50 MG/1
2 CAPSULE, EXTENDED RELEASE ORAL EVERY 4 HOURS
Refills: 0 | Status: DISCONTINUED | OUTPATIENT
Start: 2020-09-23 | End: 2020-09-25

## 2020-09-23 RX ORDER — AMLODIPINE BESYLATE 2.5 MG/1
5 TABLET ORAL DAILY
Refills: 0 | Status: DISCONTINUED | OUTPATIENT
Start: 2020-09-23 | End: 2020-09-25

## 2020-09-23 RX ORDER — HEPARIN SODIUM 5000 [USP'U]/ML
6000 INJECTION INTRAVENOUS; SUBCUTANEOUS ONCE
Refills: 0 | Status: COMPLETED | OUTPATIENT
Start: 2020-09-23 | End: 2020-09-23

## 2020-09-23 RX ORDER — HEPARIN SODIUM 5000 [USP'U]/ML
INJECTION INTRAVENOUS; SUBCUTANEOUS
Qty: 25000 | Refills: 0 | Status: DISCONTINUED | OUTPATIENT
Start: 2020-09-23 | End: 2020-09-25

## 2020-09-23 RX ADMIN — HEPARIN SODIUM 6000 UNIT(S): 5000 INJECTION INTRAVENOUS; SUBCUTANEOUS at 20:18

## 2020-09-23 RX ADMIN — HEPARIN SODIUM 1300 UNIT(S)/HR: 5000 INJECTION INTRAVENOUS; SUBCUTANEOUS at 20:17

## 2020-09-23 NOTE — ED PROVIDER NOTE - PHYSICAL EXAMINATION
Gen: NAD, AOx3  Head: NCAT  HEENT: PERRL, oral mucosa moist, normal conjunctiva, oropharynx clear without exudate or erythema  Lung: CTAB, no respiratory distress, no wheezing, rales, rhonchi  CV: normal s1/s2, rrr, Normal perfusion, pulses 2+ throughout  Abd: soft, NTND, no CVA tenderness  Genitourinary: no pelvic tenderness  MSK: 2+ pitting edema to RLE w/ calf tenderness, no visible deformities, full range of motion in all 4 extremities  Neuro: No focal neurologic deficits  Skin: No rash   Psych: normal affect

## 2020-09-23 NOTE — H&P ADULT - ASSESSMENT
Admit to Dr. Lamas   Heparin drip   monitor ptt range 60-90   preop for OR friday  for venogram possible thrombectomy   pain management

## 2020-09-23 NOTE — ED PROVIDER NOTE - OBJECTIVE STATEMENT
69 yo male with a pmh of HTN presents c/o RLE edema that has been gradually worsening for 1 week. denies any other symptoms including fevers, chill, headache, recent illness/travel, cough, abdominal pain, chest pain, or SOB

## 2020-09-23 NOTE — ED ADULT NURSE REASSESSMENT NOTE - NS ED NURSE REASSESS COMMENT FT1
Pt assessed/ Pt is awake and alert. Pt denies any pain or discomfort at this time. Heparin drip running at 13cc/hr. Pt ambulates independently. Pt admitted to Surgery team. Awaiting bed upstairs. Pt is not in any distress. Safety and comfort maintained. Will continue to monitor.

## 2020-09-23 NOTE — H&P ADULT - NSHPLABSRESULTS_GEN_ALL_CORE
16.7   9.89  )-----------( 193      ( 09-23 @ 17:20 )             50.1                    141   |  101   |  15                 Ca: 9.5    BMP:   ----------------------------< 88     Mg: x     (09-23-20 @ 17:20)             4.2    |  28    | 1.1                Ph: x        LFT:     TPro: 6.7 / Alb: 4.0 / TBili: 0.5 / DBili: x / AST: 14 / ALT: 6 / AlkPhos: 147   (09-23-20 @ 17:20)          PT/INR - ( 23 Sep 2020 17:20 )   PT: 11.10 sec;   INR: 0.97 ratio         PTT - ( 23 Sep 2020 17:20 )  PTT:25.1 sec    CARDIAC MARKERS ( 23 Sep 2020 17:20 )  x     / <0.01 ng/mL / x     / x     / x                    venous duplex: extensive rle dvt

## 2020-09-23 NOTE — ED PROVIDER NOTE - ATTENDING CONTRIBUTION TO CARE
71 yo m with pmh of gerd, sent from outpt radiology [facility] for R DVT.  as per pt had a recent 4 hr drive to [facility].  pt noted R leg pain and swelling for a few days.  no cp or sob.  no abd pain, no n/v/d.  +smoker.  exam: nad, ncat, perrl, eomi, mmm, rrr, ctab, abd soft, nt,nd aox3, +RLE swelling, mildly erythematous, mild calf tenderness, LLE no tenderness imp: pt with RLE swelling and erythema, will do duplex in ED b/l

## 2020-09-23 NOTE — ED PROVIDER NOTE - CLINICAL SUMMARY MEDICAL DECISION MAKING FREE TEXT BOX
Pt with b/l dvt, extensive of the RLE, vascular consulted and eval pt, will admit to their service for thrombectomy

## 2020-09-23 NOTE — H&P ADULT - HISTORY OF PRESENT ILLNESS
HPI:  71 yo male with PMH of HTN on Amlodipine, presents to the ED with 1 week of right leg pain. Pt states the pain is associated with swelling. Pt states pain is better at night and improves with elevation. Pain is worse with movement and is worst behind the right knee. Patient thought pain would go away on its own but since it has been a week he decided to come in to the ED.

## 2020-09-23 NOTE — H&P ADULT - NSHPPHYSICALEXAM_GEN_ALL_CORE
rPHYSICAL EXAM  Vital Signs Last 24 Hrs  T(C): 36.9 (23 Sep 2020 16:29), Max: 36.9 (23 Sep 2020 16:29)  T(F): 98.4 (23 Sep 2020 16:29), Max: 98.4 (23 Sep 2020 16:29)  HR: 92 (23 Sep 2020 16:29) (92 - 92)  BP: 146/110 (23 Sep 2020 16:29) (146/110 - 146/110)  RR: 20 (23 Sep 2020 16:29) (20 - 20)  SpO2: 98% (23 Sep 2020 16:29) (98% - 98%)    Appearance: Normal	  HEENT:   Normal oral mucosa, PERRL, EOMI	  Neck: Supple, - JVD; Carotid Bruit   Cardiovascular: Normal S1 S2, No JVD, No murmurs,   Respiratory: Lungs clear to auscultation, No Rales, Rhonchi, Wheezing	  Gastrointestinal:  Soft, Non-tender, positive BS	  Skin: No rashes, No ecchymoses, No cyanosis Right lower extremity moderately edematous compared to left   Extremities: Normal range of motion. Tenderness on palpation of Right popliteal fossa  Vascular: Peripheral pulses dopplerable on bilateral lower extremities +Dorsalis Pedal, +Posterior Tibial

## 2020-09-23 NOTE — CONSULT NOTE ADULT - SUBJECTIVE AND OBJECTIVE BOX
VASCULAR SURGERY CONSULT NOTE      HPI:  69 yo male with PMH of HTN on Amlodipine, presents to the ED with 1 week of right leg pain. Pt states the pain is associated with swelling. Pt states pain is better at night and improves with elevation. Pain is worse with movement and is worst behind the right knee. Patient thought pain would go away on its own but since it has been a week he decided to come in to the ED.     Social hx:  + 1ppd smoker  + drinks alcohol qd  denies illicit drug us      PAST MEDICAL & SURGICAL HISTORY:  Inguinal hernia  GERD (gastroesophageal reflux disease)  History of tonsillectomy      No Known Allergies    Home Medications:  amLODIPine 5 mg oral tablet: 1 tab(s) orally once a day (29 Nov 2019 09:33)  omeprazole 40 mg oral delayed release capsule: 1 cap(s) orally once a day (29 Nov 2019 09:33)  sucralfate 1 g oral tablet:  (29 Nov 2019 09:33)    No pertinent family history of PVD    REVIEW OF SYSTEMS:  GENERAL:                                         negative  SKIN:                                                 negative  OPTHALMOLOGIC:                          negative  ENMT:                                               negative  RESPIRATORY AND THORAX:        negative  CARDIOVASCULAR:                         negative  GASTROINTESTINAL:                       negative  NEPHROLOGY:                                  negative  MUSCULOSKELETAL:                       negative  NEUROLOGIC:                                   negative  PSYCHIATRIC:                                    negative  HEMATOLOGY/LYMPHATICS:         negative  ENDOCRINE:                                     negative  ALLERGIC/IMMUNOLOGIC:            negative    12 point ROS otherwise normal except as stated in HPI    PHYSICAL EXAM  Vital Signs Last 24 Hrs  T(C): 36.9 (23 Sep 2020 16:29), Max: 36.9 (23 Sep 2020 16:29)  T(F): 98.4 (23 Sep 2020 16:29), Max: 98.4 (23 Sep 2020 16:29)  HR: 92 (23 Sep 2020 16:29) (92 - 92)  BP: 146/110 (23 Sep 2020 16:29) (146/110 - 146/110)  RR: 20 (23 Sep 2020 16:29) (20 - 20)  SpO2: 98% (23 Sep 2020 16:29) (98% - 98%)    Appearance: Normal	  HEENT:   Normal oral mucosa, PERRL, EOMI	  Neck: Supple, - JVD; Carotid Bruit   Cardiovascular: Normal S1 S2, No JVD, No murmurs,   Respiratory: Lungs clear to auscultation, No Rales, Rhonchi, Wheezing	  Gastrointestinal:  Soft, Non-tender, positive BS	  Skin: No rashes, No ecchymoses, No cyanosis Right lower extremity moderately edematous compared to left   Extremities: Normal range of motion. Tenderness on palpation of Right popliteal fossa  Vascular: Peripheral pulses dopplerable on bilateral lower extremities +Dorsalis Pedal, +Posterior Tibial  Neurologic: Non-focal  Psychiatry: A & O x 3, Mood & affect appropriate      LAB/STUDIES:                        16.7   9.89  )-----------( 193      ( 23 Sep 2020 17:20 )             50.1     09-23    141  |  101  |  15  ----------------------------<  88  4.2   |  28  |  1.1    Ca    9.5      23 Sep 2020 17:20    TPro  6.7  /  Alb  4.0  /  TBili  0.5  /  DBili  x   /  AST  14  /  ALT  6   /  AlkPhos  147<H>  09-23    PT/INR - ( 23 Sep 2020 17:20 )   PT: 11.10 sec;   INR: 0.97 ratio         PTT - ( 23 Sep 2020 17:20 )  PTT:25.1 sec  LIVER FUNCTIONS - ( 23 Sep 2020 17:20 )  Alb: 4.0 g/dL / Pro: 6.7 g/dL / ALK PHOS: 147 U/L / ALT: 6 U/L / AST: 14 U/L / GGT: x               CARDIAC MARKERS ( 23 Sep 2020 17:20 )  x     / <0.01 ng/mL / x     / x     / x            IMAGING:  Venous duplex of BL lower extremities pending official read.

## 2020-09-23 NOTE — ED ADULT NURSE NOTE - CHIEF COMPLAINT QUOTE
pt presents to ED c/o blood clot in right leg, pt was sent in by [facility], sonogram done. Swelling noted to right leg, started x 1 week ago.

## 2020-09-23 NOTE — CONSULT NOTE ADULT - ASSESSMENT
Assessment and Plan:  71 yo male with PMH of HTN on Amlodipine, presents to the ED with 1 week of right leg pain.   Venous duplex done, concerning for right-sided common femoral dvt that extends to ankle, and left peroneal dvt pending official read  - if pt does in fact have extensive DVT can offer thrombectomy as this would help relieve edema and pain more immediately  - would start heparin drip and admit  - if pt does not want surgical intervention then would recommend AC alone with Eliquis and Xarelto for 6 months   - plan discussed with Fellow Dr. Sandoval

## 2020-09-24 LAB
ANION GAP SERPL CALC-SCNC: 10 MMOL/L — SIGNIFICANT CHANGE UP (ref 7–14)
ANION GAP SERPL CALC-SCNC: 10 MMOL/L — SIGNIFICANT CHANGE UP (ref 7–14)
APPEARANCE UR: CLEAR — SIGNIFICANT CHANGE UP
APTT BLD: 61.8 SEC — HIGH (ref 27–39.2)
APTT BLD: 63.3 SEC — HIGH (ref 27–39.2)
BILIRUB UR-MCNC: NEGATIVE — SIGNIFICANT CHANGE UP
BLD GP AB SCN SERPL QL: SIGNIFICANT CHANGE UP
BUN SERPL-MCNC: 11 MG/DL — SIGNIFICANT CHANGE UP (ref 10–20)
BUN SERPL-MCNC: 12 MG/DL — SIGNIFICANT CHANGE UP (ref 10–20)
CALCIUM SERPL-MCNC: 8.7 MG/DL — SIGNIFICANT CHANGE UP (ref 8.5–10.1)
CALCIUM SERPL-MCNC: 9.3 MG/DL — SIGNIFICANT CHANGE UP (ref 8.5–10.1)
CHLORIDE SERPL-SCNC: 102 MMOL/L — SIGNIFICANT CHANGE UP (ref 98–110)
CHLORIDE SERPL-SCNC: 103 MMOL/L — SIGNIFICANT CHANGE UP (ref 98–110)
CO2 SERPL-SCNC: 26 MMOL/L — SIGNIFICANT CHANGE UP (ref 17–32)
CO2 SERPL-SCNC: 27 MMOL/L — SIGNIFICANT CHANGE UP (ref 17–32)
COLOR SPEC: SIGNIFICANT CHANGE UP
CREAT SERPL-MCNC: 0.9 MG/DL — SIGNIFICANT CHANGE UP (ref 0.7–1.5)
CREAT SERPL-MCNC: 0.9 MG/DL — SIGNIFICANT CHANGE UP (ref 0.7–1.5)
DIFF PNL FLD: NEGATIVE — SIGNIFICANT CHANGE UP
GLUCOSE SERPL-MCNC: 131 MG/DL — HIGH (ref 70–99)
GLUCOSE SERPL-MCNC: 142 MG/DL — HIGH (ref 70–99)
GLUCOSE UR QL: NEGATIVE — SIGNIFICANT CHANGE UP
HCT VFR BLD CALC: 43.9 % — SIGNIFICANT CHANGE UP (ref 42–52)
HCV AB S/CO SERPL IA: 106.2 COI — HIGH
HCV AB SERPL-IMP: REACTIVE
HGB BLD-MCNC: 14.6 G/DL — SIGNIFICANT CHANGE UP (ref 14–18)
INR BLD: 0.96 RATIO — SIGNIFICANT CHANGE UP (ref 0.65–1.3)
INR BLD: 1.02 RATIO — SIGNIFICANT CHANGE UP (ref 0.65–1.3)
KETONES UR-MCNC: NEGATIVE — SIGNIFICANT CHANGE UP
LEUKOCYTE ESTERASE UR-ACNC: NEGATIVE — SIGNIFICANT CHANGE UP
MAGNESIUM SERPL-MCNC: 1.4 MG/DL — LOW (ref 1.8–2.4)
MCHC RBC-ENTMCNC: 33 PG — HIGH (ref 27–31)
MCHC RBC-ENTMCNC: 33.3 G/DL — SIGNIFICANT CHANGE UP (ref 32–37)
MCV RBC AUTO: 99.1 FL — HIGH (ref 80–94)
NITRITE UR-MCNC: NEGATIVE — SIGNIFICANT CHANGE UP
NRBC # BLD: 0 /100 WBCS — SIGNIFICANT CHANGE UP (ref 0–0)
PH UR: 7.5 — SIGNIFICANT CHANGE UP (ref 5–8)
PLATELET # BLD AUTO: 205 K/UL — SIGNIFICANT CHANGE UP (ref 130–400)
POTASSIUM SERPL-MCNC: 3.6 MMOL/L — SIGNIFICANT CHANGE UP (ref 3.5–5)
POTASSIUM SERPL-MCNC: 3.8 MMOL/L — SIGNIFICANT CHANGE UP (ref 3.5–5)
POTASSIUM SERPL-SCNC: 3.6 MMOL/L — SIGNIFICANT CHANGE UP (ref 3.5–5)
POTASSIUM SERPL-SCNC: 3.8 MMOL/L — SIGNIFICANT CHANGE UP (ref 3.5–5)
PROT UR-MCNC: NEGATIVE — SIGNIFICANT CHANGE UP
PROTHROM AB SERPL-ACNC: 11 SEC — SIGNIFICANT CHANGE UP (ref 9.95–12.87)
PROTHROM AB SERPL-ACNC: 11.7 SEC — SIGNIFICANT CHANGE UP (ref 9.95–12.87)
RBC # BLD: 4.43 M/UL — LOW (ref 4.7–6.1)
RBC # FLD: 13.5 % — SIGNIFICANT CHANGE UP (ref 11.5–14.5)
SARS-COV-2 IGG SERPL QL IA: NEGATIVE — SIGNIFICANT CHANGE UP
SARS-COV-2 IGM SERPL IA-ACNC: <3.8 AU/ML — SIGNIFICANT CHANGE UP
SARS-COV-2 RNA SPEC QL NAA+PROBE: SIGNIFICANT CHANGE UP
SODIUM SERPL-SCNC: 138 MMOL/L — SIGNIFICANT CHANGE UP (ref 135–146)
SODIUM SERPL-SCNC: 140 MMOL/L — SIGNIFICANT CHANGE UP (ref 135–146)
SP GR SPEC: 1.01 — SIGNIFICANT CHANGE UP (ref 1.01–1.03)
UROBILINOGEN FLD QL: SIGNIFICANT CHANGE UP
WBC # BLD: 8.31 K/UL — SIGNIFICANT CHANGE UP (ref 4.8–10.8)
WBC # FLD AUTO: 8.31 K/UL — SIGNIFICANT CHANGE UP (ref 4.8–10.8)

## 2020-09-24 PROCEDURE — 99231 SBSQ HOSP IP/OBS SF/LOW 25: CPT

## 2020-09-24 RX ORDER — CHLORHEXIDINE GLUCONATE 213 G/1000ML
1 SOLUTION TOPICAL ONCE
Refills: 0 | Status: DISCONTINUED | OUTPATIENT
Start: 2020-09-24 | End: 2020-09-25

## 2020-09-24 RX ORDER — INFLUENZA VIRUS VACCINE 15; 15; 15; 15 UG/.5ML; UG/.5ML; UG/.5ML; UG/.5ML
0.5 SUSPENSION INTRAMUSCULAR ONCE
Refills: 0 | Status: COMPLETED | OUTPATIENT
Start: 2020-09-24 | End: 2020-09-24

## 2020-09-24 RX ORDER — ACETAMINOPHEN 500 MG
650 TABLET ORAL EVERY 6 HOURS
Refills: 0 | Status: DISCONTINUED | OUTPATIENT
Start: 2020-09-24 | End: 2020-09-25

## 2020-09-24 RX ORDER — MAGNESIUM SULFATE 500 MG/ML
2 VIAL (ML) INJECTION
Refills: 0 | Status: COMPLETED | OUTPATIENT
Start: 2020-09-24 | End: 2020-09-24

## 2020-09-24 RX ADMIN — Medication 650 MILLIGRAM(S): at 18:32

## 2020-09-24 RX ADMIN — Medication 650 MILLIGRAM(S): at 18:02

## 2020-09-24 RX ADMIN — Medication 50 GRAM(S): at 16:16

## 2020-09-24 RX ADMIN — HEPARIN SODIUM 1600 UNIT(S)/HR: 5000 INJECTION INTRAVENOUS; SUBCUTANEOUS at 05:22

## 2020-09-24 RX ADMIN — AMLODIPINE BESYLATE 5 MILLIGRAM(S): 2.5 TABLET ORAL at 05:22

## 2020-09-24 RX ADMIN — Medication 50 GRAM(S): at 14:37

## 2020-09-24 NOTE — PROGRESS NOTE ADULT - SUBJECTIVE AND OBJECTIVE BOX
Progress Note: General Surgery  Patient: KEYONA SAMAYOA , 70y (1950)Male   MRN: [id number]  Location: [address]  Visit: 20 Inpatient  Date: 20 @ 12:33    Procedure/Diagnosis: RLE extensive DVT and left perineal DVT    Events/ 24h: No acute events overnight. Pain controlled.    Vitals: T(F): 97.8 (20 @ 09:45), Max: 98.6 (20 @ 05:00)  HR: 66 (20 @ 09:45)  BP: 165/72 (20 @ 09:45) (145/71 - 183/84)  RR: 18 (20 @ 09:45)  SpO2: 98% (20 @ 09:45)    In:   20 @ 07:01  -  20 @ 07:00  --------------------------------------------------------  IN: 0 mL    20 @ 07:  -  20 @ 12:33  --------------------------------------------------------  IN: 78 mL      Out:   20 @ 07:01  -  20 @ 07:00  --------------------------------------------------------  OUT:    Voided (mL): 600 mL  Total OUT: 600 mL      20 @ 07:01  -  20 @ 12:33  --------------------------------------------------------  OUT:  Total OUT: 0 mL        Net:   20 @ 07:01  -  20 @ 07:00  --------------------------------------------------------  NET: -600 mL    20 @ 07:01  -  20 @ 12:33  --------------------------------------------------------  NET: 78 mL        Diet: Diet, Regular (20 @ 20:47)        Appearance: Normal	  HEENT:   Normal oral mucosa, PERRL, EOMI	  Neck: Supple, - JVD; Carotid Bruit   Cardiovascular: Normal S1 S2, No JVD, No murmurs,   Respiratory: Lungs clear to auscultation, No Rales, Rhonchi, Wheezing	  Gastrointestinal:  Soft, Non-tender, positive BS	  Skin: No rashes, No ecchymoses, No cyanosis Right lower extremity moderately edematous compared to left   Extremities: Normal range of motion. Tenderness on palpation of Right popliteal fossa  Vascular: Peripheral pulses dopplerable on bilateral lower extremities +Dorsalis Pedal, +Posterior Tibial        Medications: [Standing]  amLODIPine   Tablet 5 milliGRAM(s) Oral daily  chlorhexidine 4% Liquid 1 Application(s) Topical once  heparin  Infusion.  Unit(s)/Hr (13 mL/Hr) IV Continuous <Continuous>  influenza   Vaccine 0.5 milliLiter(s) IntraMuscular once  pantoprazole    Tablet 40 milliGRAM(s) Oral before breakfast    DVT Prophylaxis: heparin  Infusion.  Unit(s)/Hr IV Continuous <Continuous>    GI Prophylaxis: pantoprazole    Tablet 40 milliGRAM(s) Oral before breakfast    Antibiotics:   Anticoagulation:   Medications:[PRN]  morphine  - Injectable 2 milliGRAM(s) IV Push every 4 hours PRN      Labs:                        14.6   8.31  )-----------( 205      ( 24 Sep 2020 08:24 )             43.9         140  |  103  |  12  ----------------------------<  142<H>  3.8   |  27  |  0.9    Ca    9.3      24 Sep 2020 08:24  Mg     1.4         TPro  6.7  /  Alb  4.0  /  TBili  0.5  /  DBili  x   /  AST  14  /  ALT  6   /  AlkPhos  147<H>      LIVER FUNCTIONS - ( 23 Sep 2020 17:20 )  Alb: 4.0 g/dL / Pro: 6.7 g/dL / ALK PHOS: 147 U/L / ALT: 6 U/L / AST: 14 U/L / GGT: x           PT/INR - ( 24 Sep 2020 08:24 )   PT: 11.70 sec;   INR: 1.02 ratio         PTT - ( 24 Sep 2020 08:24 )  PTT:61.8 sec    CARDIAC MARKERS ( 23 Sep 2020 17:20 )  x     / <0.01 ng/mL / x     / x     / x          Urine/Micro:    Urinalysis Basic - ( 24 Sep 2020 06:30 )    Color: Light Yellow / Appearance: Clear / S.013 / pH: x  Gluc: x / Ketone: Negative  / Bili: Negative / Urobili: <2 mg/dL   Blood: x / Protein: Negative / Nitrite: Negative   Leuk Esterase: Negative / RBC: x / WBC x   Sq Epi: x / Non Sq Epi: x / Bacteria: x        Imaging:     < from: Xray Chest 2 Views PA/Lat (20 @ 18:58) >  Impression:    No radiographic evidence of acute cardiopulmonary disease.    < end of copied text >

## 2020-09-24 NOTE — PROGRESS NOTE ADULT - ASSESSMENT
Assessment: Patient is a 70 year old male found to have a right lower extremity extensive DVT and left peroneal DVT.     Plan:    Monitor vitals  pain control  Booked OR 9/25/2020 for right lower extremity venogram possible thrombectomy, possible thrombolysis  Preop and consent

## 2020-09-25 ENCOUNTER — TRANSCRIPTION ENCOUNTER (OUTPATIENT)
Age: 70
End: 2020-09-25

## 2020-09-25 ENCOUNTER — RESULT REVIEW (OUTPATIENT)
Age: 70
End: 2020-09-25

## 2020-09-25 DIAGNOSIS — I82.409 ACUTE EMBOLISM AND THROMBOSIS OF UNSPECIFIED DEEP VEINS OF UNSPECIFIED LOWER EXTREMITY: ICD-10-CM

## 2020-09-25 LAB
ANION GAP SERPL CALC-SCNC: 14 MMOL/L — SIGNIFICANT CHANGE UP (ref 7–14)
APTT BLD: 63.4 SEC — HIGH (ref 27–39.2)
APTT BLD: 65.1 SEC — HIGH (ref 27–39.2)
BUN SERPL-MCNC: 13 MG/DL — SIGNIFICANT CHANGE UP (ref 10–20)
CALCIUM SERPL-MCNC: 9.2 MG/DL — SIGNIFICANT CHANGE UP (ref 8.5–10.1)
CHLORIDE SERPL-SCNC: 100 MMOL/L — SIGNIFICANT CHANGE UP (ref 98–110)
CO2 SERPL-SCNC: 24 MMOL/L — SIGNIFICANT CHANGE UP (ref 17–32)
CREAT SERPL-MCNC: 0.9 MG/DL — SIGNIFICANT CHANGE UP (ref 0.7–1.5)
GLUCOSE SERPL-MCNC: 89 MG/DL — SIGNIFICANT CHANGE UP (ref 70–99)
HCT VFR BLD CALC: 44.3 % — SIGNIFICANT CHANGE UP (ref 42–52)
HGB BLD-MCNC: 14.8 G/DL — SIGNIFICANT CHANGE UP (ref 14–18)
INR BLD: 1.09 RATIO — SIGNIFICANT CHANGE UP (ref 0.65–1.3)
MAGNESIUM SERPL-MCNC: 1.7 MG/DL — LOW (ref 1.8–2.4)
MCHC RBC-ENTMCNC: 33.3 PG — HIGH (ref 27–31)
MCHC RBC-ENTMCNC: 33.4 G/DL — SIGNIFICANT CHANGE UP (ref 32–37)
MCV RBC AUTO: 99.6 FL — HIGH (ref 80–94)
NRBC # BLD: 0 /100 WBCS — SIGNIFICANT CHANGE UP (ref 0–0)
PHOSPHATE SERPL-MCNC: 3.2 MG/DL — SIGNIFICANT CHANGE UP (ref 2.1–4.9)
PLATELET # BLD AUTO: 212 K/UL — SIGNIFICANT CHANGE UP (ref 130–400)
POTASSIUM SERPL-MCNC: 3.8 MMOL/L — SIGNIFICANT CHANGE UP (ref 3.5–5)
POTASSIUM SERPL-SCNC: 3.8 MMOL/L — SIGNIFICANT CHANGE UP (ref 3.5–5)
PROTHROM AB SERPL-ACNC: 12.5 SEC — SIGNIFICANT CHANGE UP (ref 9.95–12.87)
RBC # BLD: 4.45 M/UL — LOW (ref 4.7–6.1)
RBC # FLD: 13.3 % — SIGNIFICANT CHANGE UP (ref 11.5–14.5)
SODIUM SERPL-SCNC: 138 MMOL/L — SIGNIFICANT CHANGE UP (ref 135–146)
WBC # BLD: 9.17 K/UL — SIGNIFICANT CHANGE UP (ref 4.8–10.8)
WBC # FLD AUTO: 9.17 K/UL — SIGNIFICANT CHANGE UP (ref 4.8–10.8)

## 2020-09-25 PROCEDURE — 37187 VENOUS MECH THROMBECTOMY: CPT

## 2020-09-25 PROCEDURE — 76937 US GUIDE VASCULAR ACCESS: CPT | Mod: 26

## 2020-09-25 PROCEDURE — 37248 TRLUML BALO ANGIOP 1ST VEIN: CPT

## 2020-09-25 PROCEDURE — 88304 TISSUE EXAM BY PATHOLOGIST: CPT | Mod: 26

## 2020-09-25 RX ORDER — AMLODIPINE BESYLATE 2.5 MG/1
5 TABLET ORAL DAILY
Refills: 0 | Status: DISCONTINUED | OUTPATIENT
Start: 2020-09-25 | End: 2020-09-27

## 2020-09-25 RX ORDER — HEPARIN SODIUM 5000 [USP'U]/ML
1300 INJECTION INTRAVENOUS; SUBCUTANEOUS
Qty: 25000 | Refills: 0 | Status: DISCONTINUED | OUTPATIENT
Start: 2020-09-25 | End: 2020-09-26

## 2020-09-25 RX ORDER — ACETAMINOPHEN 500 MG
650 TABLET ORAL EVERY 6 HOURS
Refills: 0 | Status: DISCONTINUED | OUTPATIENT
Start: 2020-09-25 | End: 2020-09-27

## 2020-09-25 RX ORDER — HYDROMORPHONE HYDROCHLORIDE 2 MG/ML
1 INJECTION INTRAMUSCULAR; INTRAVENOUS; SUBCUTANEOUS
Refills: 0 | Status: DISCONTINUED | OUTPATIENT
Start: 2020-09-25 | End: 2020-09-25

## 2020-09-25 RX ORDER — ONDANSETRON 8 MG/1
4 TABLET, FILM COATED ORAL ONCE
Refills: 0 | Status: DISCONTINUED | OUTPATIENT
Start: 2020-09-25 | End: 2020-09-25

## 2020-09-25 RX ORDER — PANTOPRAZOLE SODIUM 20 MG/1
40 TABLET, DELAYED RELEASE ORAL
Refills: 0 | Status: DISCONTINUED | OUTPATIENT
Start: 2020-09-25 | End: 2020-09-27

## 2020-09-25 RX ORDER — HYDROMORPHONE HYDROCHLORIDE 2 MG/ML
0.5 INJECTION INTRAMUSCULAR; INTRAVENOUS; SUBCUTANEOUS
Refills: 0 | Status: DISCONTINUED | OUTPATIENT
Start: 2020-09-25 | End: 2020-09-25

## 2020-09-25 RX ORDER — HEPARIN SODIUM 5000 [USP'U]/ML
INJECTION INTRAVENOUS; SUBCUTANEOUS
Qty: 25000 | Refills: 0 | Status: DISCONTINUED | OUTPATIENT
Start: 2020-09-25 | End: 2020-09-25

## 2020-09-25 RX ORDER — SODIUM CHLORIDE 9 MG/ML
1000 INJECTION, SOLUTION INTRAVENOUS
Refills: 0 | Status: DISCONTINUED | OUTPATIENT
Start: 2020-09-25 | End: 2020-09-25

## 2020-09-25 RX ADMIN — AMLODIPINE BESYLATE 5 MILLIGRAM(S): 2.5 TABLET ORAL at 05:26

## 2020-09-25 RX ADMIN — HEPARIN SODIUM 1300 UNIT(S)/HR: 5000 INJECTION INTRAVENOUS; SUBCUTANEOUS at 17:51

## 2020-09-25 RX ADMIN — PANTOPRAZOLE SODIUM 40 MILLIGRAM(S): 20 TABLET, DELAYED RELEASE ORAL at 05:26

## 2020-09-25 RX ADMIN — Medication 650 MILLIGRAM(S): at 21:09

## 2020-09-25 RX ADMIN — SODIUM CHLORIDE 100 MILLILITER(S): 9 INJECTION, SOLUTION INTRAVENOUS at 17:16

## 2020-09-25 NOTE — DISCHARGE NOTE PROVIDER - NSDCCPCAREPLAN_GEN_ALL_CORE_FT
PRINCIPAL DISCHARGE DIAGNOSIS  Diagnosis: DVT (deep venous thrombosis)  Assessment and Plan of Treatment:

## 2020-09-25 NOTE — DISCHARGE NOTE PROVIDER - CARE PROVIDER_API CALL
Ramu Lamas  SURGERY  [address] [place] [address]  Phone: [phone]  Fax: [phone]  Established Patient  Follow Up Time:    Jude Walton  SURGERY  [address] [place] [id number]  Phone: [phone]  Fax: [phone]  Follow Up Time:

## 2020-09-25 NOTE — PROGRESS NOTE ADULT - SUBJECTIVE AND OBJECTIVE BOX
Patient is a 70y old  Male who presents with a chief complaint of bilateral lower extremity swelling     PAST MEDICAL & SURGICAL HISTORY:  Inguinal hernia    GERD (gastroesophageal reflux disease)    History of tonsillectomy        Events of the Last 24h:  Vital Signs Last 24 Hrs  T(C): 37 (24 Sep 2020 21:12), Max: 37.4 (24 Sep 2020 20:16)  T(F): 98.6 (24 Sep 2020 21:12), Max: 99.3 (24 Sep 2020 20:16)  HR: 73 (24 Sep 2020 21:12) (62 - 73)  BP: 168/81 (24 Sep 2020 21:12) (145/71 - 170/79)  BP(mean): --  RR: 18 (24 Sep 2020 20:16) (18 - 18)  SpO2: 100% (24 Sep 2020 21:) (98% - 100%)        Diet, NPO after Midnight:      NPO Start Date: 24-Sep-2020,   NPO Start Time: 23:59 (20 @ 13:41)  Diet, Regular (20 @ 20:47)      I&O's Summary    23 Sep 2020 07:  -  24 Sep 2020 07:00  --------------------------------------------------------  IN: 0 mL / OUT: 600 mL / NET: -600 mL    24 Sep 2020 07:01  -  25 Sep 2020 01:16  --------------------------------------------------------  IN: 130 mL / OUT: 0 mL / NET: 130 mL     I&O's Detail    23 Sep 2020 07:  -  24 Sep 2020 07:00  --------------------------------------------------------  IN:  Total IN: 0 mL    OUT:    Voided (mL): 600 mL  Total OUT: 600 mL    Total NET: -600 mL      24 Sep 2020 07:01  -  25 Sep 2020 01:16  --------------------------------------------------------  IN:    Heparin Infusion: 130 mL  Total IN: 130 mL    OUT:  Total OUT: 0 mL    Total NET: 130 mL          MEDICATIONS  (STANDING):  amLODIPine   Tablet 5 milliGRAM(s) Oral daily  chlorhexidine 4% Liquid 1 Application(s) Topical once  heparin  Infusion.  Unit(s)/Hr (13 mL/Hr) IV Continuous <Continuous>  influenza   Vaccine 0.5 milliLiter(s) IntraMuscular once  pantoprazole    Tablet 40 milliGRAM(s) Oral before breakfast    MEDICATIONS  (PRN):  acetaminophen   Tablet .. 650 milliGRAM(s) Oral every 6 hours PRN Moderate Pain (4 - 6)  morphine  - Injectable 2 milliGRAM(s) IV Push every 4 hours PRN Moderate Pain (4 - 6)      PHYSICAL EXAM:    GENERAL: NAD    HEENT: NCAT    CHEST/LUNGS: CTAB    HEART: RRR,  No murmurs, rubs, or gallops    ABDOMEN: SNTND +BS    EXTREMITIES:  FROM, No clubbing, cyanosis, or edema, palpable pulse    NEURO: No focal neurological deficits    SKIN: No rashes or lesions    INCISION/WOUNDS:                          14.6   8.31  )-----------( 205      ( 24 Sep 2020 08:24 )             43.9        CBC Full  -  ( 24 Sep 2020 08:24 )  WBC Count : 8.31 K/uL  RBC Count : 4.43 M/uL  Hemoglobin : 14.6 g/dL  Hematocrit : 43.9 %  Platelet Count - Automated : 205 K/uL  Mean Cell Volume : 99.1 fL  Mean Cell Hemoglobin : 33.0 pg  Mean Cell Hemoglobin Concentration : 33.3 g/dL  Auto Neutrophil # : x  Auto Lymphocyte # : x  Auto Monocyte # : x  Auto Eosinophil # : x  Auto Basophil # : x  Auto Neutrophil % : x  Auto Lymphocyte % : x  Auto Monocyte % : x  Auto Eosinophil % : x  Auto Basophil % : x               138   |  102   |  11                 Ca: 8.7    BMP:   ----------------------------< 131    Mg: x     (20 @ 17:45)             3.6    |  26    | 0.9                Ph: x        LFT:     TPro: 6.7 / Alb: 4.0 / TBili: 0.5 / DBili: x / AST: 14 / ALT: 6 / AlkPhos: 147   (20 @ 17:20)    LIVER FUNCTIONS - ( 23 Sep 2020 17:20 )  Alb: 4.0 g/dL / Pro: 6.7 g/dL / ALK PHOS: 147 U/L / ALT: 6 U/L / AST: 14 U/L / GGT: x           PT/INR - ( 24 Sep 2020 17:45 )   PT: 11.00 sec;   INR: 0.96 ratio         PTT - ( 24 Sep 2020 17:45 )  PTT:63.3 sec  CARDIAC MARKERS ( 23 Sep 2020 17:20 )  x     / <0.01 ng/mL / x     / x     / x          Urinalysis Basic - ( 24 Sep 2020 06:30 )    Color: Light Yellow / Appearance: Clear / S.013 / pH: x  Gluc: x / Ketone: Negative  / Bili: Negative / Urobili: <2 mg/dL   Blood: x / Protein: Negative / Nitrite: Negative   Leuk Esterase: Negative / RBC: x / WBC x   Sq Epi: x / Non Sq Epi: x / Bacteria: x

## 2020-09-25 NOTE — PRE-ANESTHESIA EVALUATION ADULT - NSANTHOSAYNRD_GEN_A_CORE
denies/No. LALA screening performed.  STOP BANG Legend: 0-2 = LOW Risk; 3-4 = INTERMEDIATE Risk; 5-8 = HIGH Risk

## 2020-09-25 NOTE — DISCHARGE NOTE PROVIDER - CARE PROVIDERS DIRECT ADDRESSES
,[email or web address]

## 2020-09-25 NOTE — PRE-ANESTHESIA EVALUATION ADULT - NSANTHADDINFOFT_GEN_ALL_CORE
risks, benefits, alternatives, general anesthesia discussed with the patient and he is agreeable to the plan

## 2020-09-25 NOTE — CHART NOTE - NSCHARTNOTEFT_GEN_A_CORE
Vascular Surgery Post-Op Note, PCN:     Pre-Op Dx: DVT    DVT, lower extremity    DVT (deep venous thrombosis)      Procedure: Venogram, iliac      Surgeon: Dr Walton    Subjective: s/p right venous thrombectomy, angioplasty    Vital Signs Last 24 Hrs  T(C): 37.2 (25 Sep 2020 20:05), Max: 37.2 (25 Sep 2020 20:05)  T(F): 98.9 (25 Sep 2020 20:05), Max: 98.9 (25 Sep 2020 20:05)  HR: 81 (25 Sep 2020 21:40) (60 - 81)  BP: 169/75 (25 Sep 2020 21:40) (136/73 - 187/90)  BP(mean): --  RR: 18 (25 Sep 2020 20:05) (16 - 22)  SpO2: 98% (25 Sep 2020 19:05) (98% - 100%)    Appearance: Normal	  HEENT:   Normal oral mucosa, PERRL, EOMI	  Neck: Supple, - JVD; Carotid Bruit   Cardiovascular: Normal S1 S2, No JVD, No murmurs,   Respiratory: Lungs clear to auscultation, No Rales, Rhonchi, Wheezing	  Gastrointestinal:  Soft, Non-tender, positive BS	  Skin: No rashes, No ecchymoses, No cyanosis Right lower extremity moderately edematous compared to left   Extremities: Normal range of motion. Tenderness on palpation of Right popliteal fossa, dressing clean dry and intact  Vascular: Peripheral pulses dopplerable on bilateral lower extremities +Dorsalis Pedal, +Posterior Tibial    LABS:                        14.8   9.17  )-----------( 212      ( 25 Sep 2020 06:45 )             44.3     09-25    138  |  100  |  13  ----------------------------<  89  3.8   |  24  |  0.9    Ca    9.2      25 Sep 2020 11:36  Phos  3.2     09-25  Mg     1.7     09-25      PT/INR - ( 25 Sep 2020 20:00 )   PT: 12.50 sec;   INR: 1.09 ratio         PTT - ( 25 Sep 2020 20:00 )  PTT:65.1 sec  CAPILLARY BLOOD GLUCOSE          Urinalysis Basic - ( 24 Sep 2020 06:30 )    Color: Light Yellow / Appearance: Clear / S.013 / pH: x  Gluc: x / Ketone: Negative  / Bili: Negative / Urobili: <2 mg/dL   Blood: x / Protein: Negative / Nitrite: Negative   Leuk Esterase: Negative / RBC: x / WBC x   Sq Epi: x / Non Sq Epi: x / Bacteria: x        Radiology and Additional Studies:    Assessment: 70y Male s/p right venous thrombectomy, angioplasty    Plan:  Pain/nausea control PRN  Home meds  Incentive spirometer/OOB/Ambulate  Vitals per protocol  IVF, Diet: regular  follow up coags

## 2020-09-25 NOTE — DISCHARGE NOTE PROVIDER - NSDCCPTREATMENT_GEN_ALL_CORE_FT
PRINCIPAL PROCEDURE  Procedure: Venous thrombectomy with imaging guidance  Findings and Treatment:

## 2020-09-25 NOTE — CHART NOTE - NSCHARTNOTEFT_GEN_A_CORE
Anesthesia Post Op Assessment  		(    ) Intubated           TV _____	Rate _sv____	FiO2__4lnc___  		(  x  ) Patent airway. Full return of protective reflexes  		( x   )Full recovery from anesthesia/sedation to baseline status      Cardiovascular Function:  		BP:	187/76	                  Pulse:		71                  RR:		12                  Temp:		98.2                  O2Sat:     100      Mental Status:  	        (  x  ) awake		  (  x  ) alert		 (    ) drowsy	               (    ) sedated      Nausea/Vomiting:  		(    ) Yes, See post-op orders		   (  x  ) No      Pain Scale: (0-10):			Treatment:     (    ) None	            ( x   ) See Post-Op/PCA Orders      Post-operative Fluids: 	   (    ) Oral	          ( x   ) See post-op Orders        Comments:    Uneventful. No complications from anesthesia.  Discharge when criteria met.

## 2020-09-25 NOTE — DISCHARGE NOTE PROVIDER - NSDCMRMEDTOKEN_GEN_ALL_CORE_FT
amLODIPine 5 mg oral tablet: 1 tab(s) orally once a day  omeprazole 40 mg oral delayed release capsule: 1 cap(s) orally once a day  oxyCODONE-acetaminophen 5 mg-325 mg oral tablet: 1 tab(s) orally every 6 hours, As Needed -for severe pain MDD:4 tab   sucralfate 1 g oral tablet:    acetaminophen 325 mg oral tablet: 2 tab(s) orally every 6 hours, As needed, Moderate Pain (4 - 6)  amLODIPine 5 mg oral tablet: 1 tab(s) orally once a day  apixaban 5 mg oral tablet: 2 tab(s) orally every 12 hours  omeprazole 40 mg oral delayed release capsule: 1 cap(s) orally once a day  oxyCODONE-acetaminophen 5 mg-325 mg oral tablet: 1 tab(s) orally every 6 hours, As Needed -for severe pain MDD:4 tab   sucralfate 1 g oral tablet:    acetaminophen 325 mg oral tablet: 2 tab(s) orally every 6 hours, As needed, Moderate Pain (4 - 6)  amLODIPine 5 mg oral tablet: 1 tab(s) orally once a day  apixaban 5 mg oral tablet: 2 tab(s) orally every 12 hours  apixaban 5 mg oral tablet: 2 tab(s) orally 2 times a day MDD:For the first 7 days, you will take 2 tabs, twice per day (4 pills per day). For the next 7 days, you will take 1 tab, twice per day (2 pills per day).  omeprazole 40 mg oral delayed release capsule: 1 cap(s) orally once a day  sucralfate 1 g oral tablet:

## 2020-09-25 NOTE — DISCHARGE NOTE PROVIDER - NSDCFUADDINST_GEN_ALL_CORE_FT
You are being discharged from [facility]. Please schedule a follow up appointment with Dr. Lamas as previously discussed in 2 weeks. Please follow up with your PCP. You have been prescribed medications, please take as directed. Please avoid heavy weight lifting for the next 4-6 weeks.  If you have any further questions about your care, please do not hesitate to contact Dr. Lamas at the clinic. You are being discharged from [facility]. Please schedule a follow up appointment with Dr. Lamas as previously discussed in 2 weeks. Please follow up with your PCP. You have been prescribed medications, please take as directed. Please avoid heavy weight lifting for the next 4-6 weeks.  If you have any further questions about your care, please do not hesitate to contact Dr. Lamas at the clinic.    You must take Elliquis for the next 14 days. For the first 7 days, you will take 10 mg twice per day. For the next 7 days you will take 5 mg twice per day. You are being discharged from [facility]. Please schedule a follow up appointment with Dr. Walton as previously discussed in 2 weeks. Please follow up with your PCP. You have been prescribed medications, please take as directed. Please avoid heavy weight lifting for the next 4-6 weeks.  If you have any further questions about your care, please do not hesitate to contact Dr. Walton at the clinic.    You must take Elliquis for the next 14 days. For the first 7 days, you will take 10 mg twice per day. For the next 7 days you will take 5 mg twice per day.

## 2020-09-25 NOTE — DISCHARGE NOTE PROVIDER - HOSPITAL COURSE
69 yo male with PMH of HTN on Amlodipine, presents to the ED with 1 week of right leg pain. Pt states the pain is associated with swelling. Pt states pain is better at night and improves with elevation. Pain is worse with movement and is worst behind the right knee. Patient thought pain would go away on its own but since it has been a week he decided to come in to the ED.     `dup< from: [facility] Duplex Lower Ext Vein Scan, Bilat (09.23.20 @ 17:56) >    Impression:      1. Acute deep vein thrombosis of the right common femoral vein, right popliteal vein, right posterior tibial vein and right peroneal vein.  2. Superficial thrombosis of the right gastrocnemius vein.  3. Acute deep vein thrombosis of the left peroneal vein.    ICD-10:M79.89    < end of copied text >        Patient was admitted from ED to Dr. Lamas service for RLE DVT, and started on a heparin drip. patient was preopped to go to the OR for 9/25.    On 9/25 patient went for R intravenous thrombectomy, angioplasty. Patient tolerated the procedure well. Patient remained stable post-operatively, and did well on the floor.    Patient is stable for discharge at this time, and will be discharged on anticoagulation for 2 weeks, and will f/u with Dr. Lamas in 1-2 weeks.

## 2020-09-26 LAB
APTT BLD: 57.6 SEC — HIGH (ref 27–39.2)
HCT VFR BLD CALC: 43.1 % — SIGNIFICANT CHANGE UP (ref 42–52)
HGB BLD-MCNC: 14.3 G/DL — SIGNIFICANT CHANGE UP (ref 14–18)
MCHC RBC-ENTMCNC: 32.9 PG — HIGH (ref 27–31)
MCHC RBC-ENTMCNC: 33.2 G/DL — SIGNIFICANT CHANGE UP (ref 32–37)
MCV RBC AUTO: 99.1 FL — HIGH (ref 80–94)
NRBC # BLD: 0 /100 WBCS — SIGNIFICANT CHANGE UP (ref 0–0)
PLATELET # BLD AUTO: 200 K/UL — SIGNIFICANT CHANGE UP (ref 130–400)
RBC # BLD: 4.35 M/UL — LOW (ref 4.7–6.1)
RBC # FLD: 13.2 % — SIGNIFICANT CHANGE UP (ref 11.5–14.5)
WBC # BLD: 9.09 K/UL — SIGNIFICANT CHANGE UP (ref 4.8–10.8)
WBC # FLD AUTO: 9.09 K/UL — SIGNIFICANT CHANGE UP (ref 4.8–10.8)

## 2020-09-26 PROCEDURE — 99231 SBSQ HOSP IP/OBS SF/LOW 25: CPT

## 2020-09-26 RX ORDER — APIXABAN 2.5 MG/1
10 TABLET, FILM COATED ORAL EVERY 12 HOURS
Refills: 0 | Status: DISCONTINUED | OUTPATIENT
Start: 2020-09-26 | End: 2020-09-27

## 2020-09-26 RX ADMIN — APIXABAN 10 MILLIGRAM(S): 2.5 TABLET, FILM COATED ORAL at 17:14

## 2020-09-26 RX ADMIN — Medication 650 MILLIGRAM(S): at 20:35

## 2020-09-26 RX ADMIN — PANTOPRAZOLE SODIUM 40 MILLIGRAM(S): 20 TABLET, DELAYED RELEASE ORAL at 06:12

## 2020-09-26 RX ADMIN — Medication 650 MILLIGRAM(S): at 19:52

## 2020-09-26 RX ADMIN — AMLODIPINE BESYLATE 5 MILLIGRAM(S): 2.5 TABLET ORAL at 06:12

## 2020-09-26 NOTE — PROGRESS NOTE ADULT - ASSESSMENT
Assessment: 70y Male s/p right venous thrombectomy, angioplasty    Plan:  Pain/nausea control PRN  Home meds  Incentive spirometer/OOB/Ambulate  Vitals per protocol  IVF, Diet: regular  follow up coags.

## 2020-09-26 NOTE — PROGRESS NOTE ADULT - SUBJECTIVE AND OBJECTIVE BOX
Progress Note: General Surgery  Patient: KEYONA SAMAYOA , 70y (1950)Male   MRN: [id number]  Location: [address]  Visit: 20 Inpatient  Date: 20 @ 04:16    Procedure/Diagnosis:  s/p right venous thrombectomy, angioplasty      Events/ 24h: No acute events overnight. Pain controlled.    Vitals: T(F): 98.9 (20 @ 20:05), Max: 98.9 (20 @ 20:05)  HR: 81 (20 @ 21:40)  BP: 169/75 (20 @ 21:40) (136/73 - 187/90)  RR: 18 (20 @ 20:05)  SpO2: 98% (20 @ 19:05)    In:   20 @ 07:01  -  20 @ 07:00  --------------------------------------------------------  IN: 234 mL    20 @ 07:  -  20 @ 04:16  --------------------------------------------------------  IN: 213 mL      Out:   20 @ 07:01  -  20 @ 07:00  --------------------------------------------------------  OUT:  Total OUT: 0 mL      20 @ 07:01  -  20 @ 04:16  --------------------------------------------------------  OUT:    Voided (mL): 1200 mL  Total OUT: 1200 mL        Net:   20 @ 07:01  -  20 @ 07:00  --------------------------------------------------------  NET: 234 mL    20 @ 07:01  -  20 @ 04:16  --------------------------------------------------------  NET: -987 mL        Diet: Diet, Regular (20 @ 16:54)    IV Fluids:     Appearance: Normal	  HEENT:   Normal oral mucosa, PERRL, EOMI	  Neck: Supple, - JVD; Carotid Bruit   Cardiovascular: Normal S1 S2, No JVD, No murmurs,   Respiratory: Lungs clear to auscultation, No Rales, Rhonchi, Wheezing	  Gastrointestinal:  Soft, Non-tender, positive BS	  Skin: No rashes, No ecchymoses, No cyanosis Right lower extremity moderately edematous compared to left   Extremities: Normal range of motion. Tenderness on palpation of Right popliteal fossa, dressing clean dry and intact  Vascular: Peripheral pulses dopplerable on bilateral lower extremities +Dorsalis Pedal, +Posterior Tibial        Medications: [Standing]  amLODIPine   Tablet 5 milliGRAM(s) Oral daily  heparin  Infusion 1300 Unit(s)/Hr (13 mL/Hr) IV Continuous <Continuous>  influenza   Vaccine 0.5 milliLiter(s) IntraMuscular once  pantoprazole    Tablet 40 milliGRAM(s) Oral before breakfast    DVT Prophylaxis: heparin  Infusion 1300 Unit(s)/Hr IV Continuous <Continuous>    GI Prophylaxis: pantoprazole    Tablet 40 milliGRAM(s) Oral before breakfast    Antibiotics:   Anticoagulation:   Medications:[PRN]  acetaminophen   Tablet .. 650 milliGRAM(s) Oral every 6 hours PRN      Labs:                        14.8   9.17  )-----------( 212      ( 25 Sep 2020 06:45 )             44.3         138  |  100  |  13  ----------------------------<  89  3.8   |  24  |  0.9    Ca    9.2      25 Sep 2020 11:36  Phos  3.2       Mg     1.7             PT/INR - ( 25 Sep 2020 20:00 )   PT: 12.50 sec;   INR: 1.09 ratio         PTT - ( 25 Sep 2020 20:00 )  PTT:65.1 sec        Urine/Micro:    Urinalysis Basic - ( 24 Sep 2020 06:30 )    Color: Light Yellow / Appearance: Clear / S.013 / pH: x  Gluc: x / Ketone: Negative  / Bili: Negative / Urobili: <2 mg/dL   Blood: x / Protein: Negative / Nitrite: Negative   Leuk Esterase: Negative / RBC: x / WBC x   Sq Epi: x / Non Sq Epi: x / Bacteria: x        Imaging:    no new images    Date/Time: 20 @ 04:16

## 2020-09-26 NOTE — PROGRESS NOTE ADULT - I WAS PHYSICALLY PRESENT FOR THE KEY PORTIONS OF THE EVALUATION AND MANAGEMENT (E/M) SERVICE PROVIDED.  I AGREE WITH THE ABOVE HISTORY, PHYSICAL, AND PLAN WHICH I HAVE REVIEWED AND EDITED WHERE APPROPRIATE
Gilma, Dr Vázquez, Dr Collins is already gone for the day,  Incase this patient ends up in the ER  Belia Diaz RN  Triage nurse     Statement Selected

## 2020-09-27 ENCOUNTER — TRANSCRIPTION ENCOUNTER (OUTPATIENT)
Age: 70
End: 2020-09-27

## 2020-09-27 VITALS
DIASTOLIC BLOOD PRESSURE: 76 MMHG | RESPIRATION RATE: 19 BRPM | HEART RATE: 62 BPM | TEMPERATURE: 97 F | SYSTOLIC BLOOD PRESSURE: 147 MMHG

## 2020-09-27 LAB
HCT VFR BLD CALC: 43 % — SIGNIFICANT CHANGE UP (ref 42–52)
HCV RNA FLD QL NAA+PROBE: SIGNIFICANT CHANGE UP
HGB BLD-MCNC: 14.3 G/DL — SIGNIFICANT CHANGE UP (ref 14–18)
MCHC RBC-ENTMCNC: 33.2 PG — HIGH (ref 27–31)
MCHC RBC-ENTMCNC: 33.3 G/DL — SIGNIFICANT CHANGE UP (ref 32–37)
MCV RBC AUTO: 99.8 FL — HIGH (ref 80–94)
NRBC # BLD: 0 /100 WBCS — SIGNIFICANT CHANGE UP (ref 0–0)
PLATELET # BLD AUTO: 197 K/UL — SIGNIFICANT CHANGE UP (ref 130–400)
RBC # BLD: 4.31 M/UL — LOW (ref 4.7–6.1)
RBC # FLD: 13.2 % — SIGNIFICANT CHANGE UP (ref 11.5–14.5)
WBC # BLD: 7.05 K/UL — SIGNIFICANT CHANGE UP (ref 4.8–10.8)
WBC # FLD AUTO: 7.05 K/UL — SIGNIFICANT CHANGE UP (ref 4.8–10.8)

## 2020-09-27 PROCEDURE — 99238 HOSP IP/OBS DSCHRG MGMT 30/<: CPT

## 2020-09-27 RX ORDER — APIXABAN 2.5 MG/1
2 TABLET, FILM COATED ORAL
Qty: 0 | Refills: 0 | DISCHARGE
Start: 2020-09-27

## 2020-09-27 RX ORDER — APIXABAN 2.5 MG/1
2 TABLET, FILM COATED ORAL
Qty: 42 | Refills: 0
Start: 2020-09-27 | End: 2020-10-07

## 2020-09-27 RX ORDER — ACETAMINOPHEN 500 MG
2 TABLET ORAL
Qty: 0 | Refills: 0 | DISCHARGE
Start: 2020-09-27

## 2020-09-27 RX ORDER — APIXABAN 2.5 MG/1
1 TABLET, FILM COATED ORAL
Qty: 14 | Refills: 0
Start: 2020-09-27 | End: 2020-10-03

## 2020-09-27 RX ADMIN — PANTOPRAZOLE SODIUM 40 MILLIGRAM(S): 20 TABLET, DELAYED RELEASE ORAL at 05:27

## 2020-09-27 RX ADMIN — APIXABAN 10 MILLIGRAM(S): 2.5 TABLET, FILM COATED ORAL at 16:25

## 2020-09-27 RX ADMIN — APIXABAN 10 MILLIGRAM(S): 2.5 TABLET, FILM COATED ORAL at 05:27

## 2020-09-27 RX ADMIN — AMLODIPINE BESYLATE 5 MILLIGRAM(S): 2.5 TABLET ORAL at 05:27

## 2020-09-27 NOTE — PROGRESS NOTE ADULT - ASSESSMENT
70y Male s/p right venous thrombectomy, angioplasty    Plan:    - Pain control as needed  - Hemodynamic monitoring as per routine  - Encourage ambulation and incentive spirometer use (10x/hr when awake  - GI and DVT prophylaxis  - Dispo: home with AC

## 2020-09-27 NOTE — PROGRESS NOTE ADULT - SUBJECTIVE AND OBJECTIVE BOX
GENERAL SURGERY PROGRESS NOTE     KEYONA SAMAYOA  70y  Male  Hospital day :4d  POD:  Procedure: Venogram, iliac      EVENTS IN THE LAST 24 HRS: No acute distress, patient seen and examined, AC switched to oral (eliquis)      T(F): 97.6 (09-27-20 @ 05:54), Max: 98.9 (09-26-20 @ 20:16)  HR: 77 (09-27-20 @ 07:51) (67 - 77)  BP: 157/75 (09-27-20 @ 05:54) (143/77 - 157/75)  RR: 18 (09-27-20 @ 05:54) (18 - 19)  SpO2: 97% (09-27-20 @ 07:51) (97% - 97%)    PHYSICAL EXAM:  GENERAL: NAD, well-appearing  CHEST/LUNG: Clear to auscultation bilaterally  HEART: Regular rate and rhythm  ABDOMEN: Soft, Nontender, Nondistended;   Extremities: Normal range of motion. Tenderness on palpation of Right popliteal fossa, dressing clean dry and intact  Vascular: Peripheral pulses dopplerable on bilateral lower extremities +Dorsalis Pedal, +Posterior Tibial       GI proph:  pantoprazole    Tablet 40 milliGRAM(s) Oral before breakfast    AC/ proph:   ABx:         LABS  Labs:  CAPILLARY BLOOD GLUCOSE                              14.3   7.05  )-----------( 197      ( 27 Sep 2020 04:30 )             43.0         09-25    138  |  100  |  13  ----------------------------<  89  3.8   |  24  |  0.9          LFTs:         Coags:     x      ----< x       ( 26 Sep 2020 11:29 )     57.6

## 2020-09-27 NOTE — DISCHARGE NOTE NURSING/CASE MANAGEMENT/SOCIAL WORK - PATIENT PORTAL LINK FT
You can access the FollowMyHealth Patient Portal offered by [facility] by registering at the following website: [email or web address]. By joining [facility]’s FollowMyHealth portal, you will also be able to view your health information using other applications (apps) compatible with our system.

## 2020-09-29 ENCOUNTER — APPOINTMENT (OUTPATIENT)
Dept: SURGERY | Facility: CLINIC | Age: 70
End: 2020-09-29

## 2020-10-01 NOTE — PHYSICAL EXAM
[Excoriation] : excoriations [Thrombosed] : that was thrombosed [Nonprolapsing] : a nonprolapsing (grade I) [Skin Tags] : residual hemorrhoidal skin tags were noted [Right Side] : on the right [Normal] : was normal [None] : there was no rectal abscess [Oriented to Person] : oriented to person [Oriented to Place] : oriented to place [Alert] : alert [Oriented to Time] : oriented to time [Calm] : calm [Multiple Sinus Tracts] : no perianal sinus tracts [Fistula] : no fistulas [Wart] : no warts [Ulcer ___ cm] : no ulcers [Pilonidal Cyst] : no pilonidal cysts [Pilonidal Sinus] : no pilonidal sinus [Pilonidal Sinus Draining] : no pilonidal sinus drainage [de-identified] : Healthy male, NAD [de-identified] : Full range of motion [de-identified] : No focal deficits.

## 2020-10-01 NOTE — ASSESSMENT
[FreeTextEntry1] : Mr. Cottrell has a resolving thrombosed external hemorrhoid. He currently has only mild discomfort. I prescribed for him some hydrocortisone cream which he take as needed. In addition I've advised him on dietary modification including increasing fiber to diet plenty of water as well as a stool softeners as needed. In addition he can soak in a warm tub water as needed. I've advised him that while the pain will will resolve shortly he will take a little longer for the lump to resolve. He'll return to see me in approximately 3 weeks certainly sooner should any problems arise.

## 2020-10-01 NOTE — PROCEDURE
[FreeTextEntry1] : Anoscopy performed using a disposable anoscope.  The patient was place in the left lateral decubitus position. After SEJAL was performed the anoscope was inserted and the distal rectum was evaluated along with the anal canal and anal margin.\par \par Findings:\par Examination the perineum reveals an obvious right lateral thrombosed external hemorrhoid which appears to be resolving. There is no erythema induration or drainage or bleeding.\par \par SEJAL, good tone no palpable mass.\par \par Anoscopy, non-prolapsing internal hemorrhoids.

## 2020-10-01 NOTE — HISTORY OF PRESENT ILLNESS
[FreeTextEntry1] : Is a new patient visit for Mr. Cottrell who is referred with hemorrhoids. Mr. Cottrell states that he has had hemorrhoids for many years but recently over the last 3-4 weeks they became extremely painful and swollen. He denies any nausea vomiting diarrhea or constipation he has had colonoscopy in the past and he states that the pain from the hemorrhoids somewhat resolved but they're still there. He has no family history of colon or rectal cancer.

## 2020-10-03 LAB — SURGICAL PATHOLOGY STUDY: SIGNIFICANT CHANGE UP

## 2020-10-10 DIAGNOSIS — F17.210 NICOTINE DEPENDENCE, CIGARETTES, UNCOMPLICATED: ICD-10-CM

## 2020-10-10 DIAGNOSIS — I82.441 ACUTE EMBOLISM AND THROMBOSIS OF RIGHT TIBIAL VEIN: ICD-10-CM

## 2020-10-10 DIAGNOSIS — I82.461 ACUTE EMBOLISM AND THROMBOSIS OF RIGHT CALF MUSCULAR VEIN: ICD-10-CM

## 2020-10-10 DIAGNOSIS — I82.421 ACUTE EMBOLISM AND THROMBOSIS OF RIGHT ILIAC VEIN: ICD-10-CM

## 2020-10-10 DIAGNOSIS — I87.1 COMPRESSION OF VEIN: ICD-10-CM

## 2020-10-10 DIAGNOSIS — M79.661 PAIN IN RIGHT LOWER LEG: ICD-10-CM

## 2020-10-10 DIAGNOSIS — I82.411 ACUTE EMBOLISM AND THROMBOSIS OF RIGHT FEMORAL VEIN: ICD-10-CM

## 2020-10-10 DIAGNOSIS — I82.431 ACUTE EMBOLISM AND THROMBOSIS OF RIGHT POPLITEAL VEIN: ICD-10-CM

## 2020-10-10 DIAGNOSIS — I82.453 ACUTE EMBOLISM AND THROMBOSIS OF PERONEAL VEIN, BILATERAL: ICD-10-CM

## 2020-10-10 DIAGNOSIS — I10 ESSENTIAL (PRIMARY) HYPERTENSION: ICD-10-CM

## 2020-10-10 DIAGNOSIS — K21.9 GASTRO-ESOPHAGEAL REFLUX DISEASE WITHOUT ESOPHAGITIS: ICD-10-CM

## 2020-10-14 ENCOUNTER — APPOINTMENT (OUTPATIENT)
Dept: VASCULAR SURGERY | Facility: CLINIC | Age: 70
End: 2020-10-14
Payer: SUBSIDIZED

## 2020-10-14 VITALS — DIASTOLIC BLOOD PRESSURE: 78 MMHG | SYSTOLIC BLOOD PRESSURE: 140 MMHG

## 2020-10-14 DIAGNOSIS — K64.5 PERIANAL VENOUS THROMBOSIS: ICD-10-CM

## 2020-10-14 PROCEDURE — 99213 OFFICE O/P EST LOW 20 MIN: CPT

## 2020-10-14 PROCEDURE — 93970 EXTREMITY STUDY: CPT

## 2020-10-14 NOTE — PHYSICAL EXAM
[2+] : left 2+ [Ankle Swelling (On Exam)] : present [Ankle Swelling On The Right] : mild [Varicose Veins Of Lower Extremities] : not present [] : not present [FreeTextEntry1] : right ankel 22cm Left 22 cm\par  right Mid calf  34 cmm left 32 cm\par   right mid thigh 32cm   Left 31.5 cm

## 2020-10-14 NOTE — ASSESSMENT
[FreeTextEntry1] : The patient is a 70-year-old male with unprovoked right leg extensive DVT. Patient status post right lower extremity  thrombectomy and is currently on eliquis. I recommend the patient remain on anticoagulation the patient's symptoms are overall improved. I would like to see the patient back in my office in 3 months time for repeat duplex exam.

## 2020-10-14 NOTE — DATA REVIEWED
[FreeTextEntry1] : The duplex right common femoral is patent and compressible. The crest persistent thrombus seen in the femoral popliteal peroneal posterior tibial veins. Left no evidence of acute deep venous thrombosis only getting to the hospital. He had a duplex and limited secondary to bowel gas. There is partial resolution of thrombus seen in the right iliac vein no evidence of thrombus seen in the left iliac vein at this time

## 2020-10-14 NOTE — HISTORY OF PRESENT ILLNESS
[FreeTextEntry1] : The patient is a 70-year-old male who presented to [place] on hospital with extensive right lower extremity swelling. The patient underwent a right iliac vein and right lobes from a thrombectomy. Patiently currently remains on anticoagulation with eliquis

## 2020-10-22 NOTE — ED PROVIDER NOTE - OBJECTIVE STATEMENT
Patient is a 68 yo M w/ no relevant pmh, current smoker p/w right inguinal pain. Patient states he sneezed 2 weeks ago and developed right inguinal pain that has been progressive and today worst. Patient denies N/V, no constipation, no diarrhea, no fever, no dysuria, no frequency, no testicular pain/swollen, no penile discharge. Denies trauma. numerical 0-10

## 2021-02-03 RX ORDER — APIXABAN 5 MG/1
5 TABLET, FILM COATED ORAL TWICE DAILY
Qty: 120 | Refills: 1 | Status: ACTIVE | COMMUNITY
Start: 2020-10-08 | End: 1900-01-01

## 2021-02-17 ENCOUNTER — APPOINTMENT (OUTPATIENT)
Dept: VASCULAR SURGERY | Facility: CLINIC | Age: 71
End: 2021-02-17
Payer: SUBSIDIZED

## 2021-02-17 PROCEDURE — 93971 EXTREMITY STUDY: CPT

## 2021-02-17 PROCEDURE — 99072 ADDL SUPL MATRL&STAF TM PHE: CPT

## 2021-02-17 PROCEDURE — 99213 OFFICE O/P EST LOW 20 MIN: CPT

## 2021-02-17 NOTE — ASSESSMENT
[FreeTextEntry1] : The patient is a 70-year-old male with unprovoked right leg extensive DVT. Patient status post right lower extremity  thrombectomy and is currently on eliquis. From my standpoint the patient has been sufficiently treated with anticoagulation. I recommend he followup with hematology to make the official decision whether each coagulation can be discontinued with regards to a hypercoagulable workup. He's been sufficiently treated with regards to his DVT. I will see him back in my office in one years time or sooner for any symptoms develop.

## 2021-02-17 NOTE — DATA REVIEWED
[FreeTextEntry1] : The duplex right common femoral is patent and compressible. There is  persistent thrombus seen in the femoral partial resolution in the  popliteal veins

## 2021-02-17 NOTE — HISTORY OF PRESENT ILLNESS
[FreeTextEntry1] : The patient is a 70-year-old male who presented to [facility] hospital with extensive right lower extremity swelling. The patient underwent a right iliac vein and right lobes from a thrombectomy. Patiently currently remains on anticoagulation with eliquis for the last 5 months\par \par Due to COVID-19, pre-visit patient instructions were explained to the patient and their symptoms were checked upon arrival. Masks were used by the healthcare provider and staff and the examination room was cleaned after the patient visit was concluded.\par

## 2021-03-12 NOTE — ED ADULT TRIAGE NOTE - CHIEF COMPLAINT QUOTE
pt presents to ED c/o blood clot in right leg, pt was sent in by [facility], sonogram done. Swelling noted to right leg, started x 1 week ago.
No pertinent past medical history

## 2022-02-02 ENCOUNTER — APPOINTMENT (OUTPATIENT)
Dept: VASCULAR SURGERY | Facility: CLINIC | Age: 72
End: 2022-02-02

## 2022-02-25 ENCOUNTER — APPOINTMENT (OUTPATIENT)
Dept: VASCULAR SURGERY | Facility: CLINIC | Age: 72
End: 2022-02-25
Payer: MEDICARE

## 2022-02-25 VITALS
SYSTOLIC BLOOD PRESSURE: 137 MMHG | WEIGHT: 160 LBS | DIASTOLIC BLOOD PRESSURE: 83 MMHG | BODY MASS INDEX: 25.11 KG/M2 | HEIGHT: 67 IN

## 2022-02-25 PROCEDURE — 99214 OFFICE O/P EST MOD 30 MIN: CPT

## 2022-02-25 PROCEDURE — 93971 EXTREMITY STUDY: CPT

## 2022-09-21 ENCOUNTER — APPOINTMENT (OUTPATIENT)
Dept: VASCULAR SURGERY | Facility: CLINIC | Age: 72
End: 2022-09-21

## 2022-09-21 VITALS — SYSTOLIC BLOOD PRESSURE: 155 MMHG | DIASTOLIC BLOOD PRESSURE: 86 MMHG

## 2022-09-21 VITALS — WEIGHT: 160 LBS | HEIGHT: 67 IN | BODY MASS INDEX: 25.11 KG/M2

## 2022-09-21 DIAGNOSIS — I82.409 ACUTE EMBOLISM AND THROMBOSIS OF UNSPECIFIED DEEP VEINS OF UNSPECIFIED LOWER EXTREMITY: ICD-10-CM

## 2022-09-21 DIAGNOSIS — M79.89 OTHER SPECIFIED SOFT TISSUE DISORDERS: ICD-10-CM

## 2022-09-21 DIAGNOSIS — I65.23 OCCLUSION AND STENOSIS OF BILATERAL CAROTID ARTERIES: ICD-10-CM

## 2022-09-21 PROCEDURE — 99214 OFFICE O/P EST MOD 30 MIN: CPT

## 2022-09-21 PROCEDURE — 93970 EXTREMITY STUDY: CPT

## 2022-09-22 PROBLEM — I82.409 DVT (DEEP VENOUS THROMBOSIS): Status: ACTIVE | Noted: 2020-10-08

## 2022-09-22 PROBLEM — M79.89 LEG SWELLING: Status: ACTIVE | Noted: 2020-10-14

## 2022-10-05 ENCOUNTER — APPOINTMENT (OUTPATIENT)
Dept: VASCULAR SURGERY | Facility: CLINIC | Age: 72
End: 2022-10-05

## 2022-10-05 VITALS — WEIGHT: 160 LBS | BODY MASS INDEX: 25.11 KG/M2 | HEIGHT: 67 IN

## 2022-10-05 PROCEDURE — 93880 EXTRACRANIAL BILAT STUDY: CPT

## 2023-03-01 ENCOUNTER — APPOINTMENT (OUTPATIENT)
Dept: VASCULAR SURGERY | Facility: CLINIC | Age: 73
End: 2023-03-01

## 2024-08-07 NOTE — ED ADULT NURSE NOTE - BREATHING, MLM
Patient was instructed to check INR 8/8/24. If results come in, please have MD adjust warfarin dosing as needed. No AAC in clinic that day, returning Tues 8/13/24.    Last INR 8/1 1.0. Per MD, new warfarin dosing 5mg MThSat; 10mg all other days of the week.   Spontaneous, unlabored and symmetrical

## 2024-09-20 NOTE — ED PROVIDER NOTE - CARE PROVIDER_API CALL
Zi Martinez)  [facility]  [address] [place] [address], [address]  [place] [address]  Phone: [phone]  Fax: [phone]  Follow Up Time:
no

## 2025-04-09 NOTE — ED ADULT NURSE NOTE - CHIEF COMPLAINT
The clinical goals for the shift include maintain safety    Over the shift, the patient did  make progress toward the following goals. Barriers to progression include weakness. Recommendations to address these barriers include pt education.     The patient is a 70y Male complaining of lower leg pain/injury.